# Patient Record
Sex: MALE | Race: WHITE | Employment: FULL TIME | URBAN - METROPOLITAN AREA
[De-identification: names, ages, dates, MRNs, and addresses within clinical notes are randomized per-mention and may not be internally consistent; named-entity substitution may affect disease eponyms.]

---

## 2018-02-12 ENCOUNTER — APPOINTMENT (OUTPATIENT)
Dept: MRI IMAGING | Age: 38
End: 2018-02-12
Attending: FAMILY MEDICINE
Payer: COMMERCIAL

## 2018-02-12 ENCOUNTER — APPOINTMENT (OUTPATIENT)
Dept: CT IMAGING | Age: 38
End: 2018-02-12
Attending: EMERGENCY MEDICINE
Payer: COMMERCIAL

## 2018-02-12 ENCOUNTER — HOSPITAL ENCOUNTER (OUTPATIENT)
Age: 38
Setting detail: OBSERVATION
Discharge: HOME | End: 2018-02-13
Attending: EMERGENCY MEDICINE | Admitting: FAMILY MEDICINE
Payer: COMMERCIAL

## 2018-02-12 DIAGNOSIS — F12.10 DRUG ABUSE, MARIJUANA: ICD-10-CM

## 2018-02-12 DIAGNOSIS — R47.9 SPEECH DISTURBANCE, UNSPECIFIED TYPE: Primary | ICD-10-CM

## 2018-02-12 DIAGNOSIS — E78.5 HYPERLIPIDEMIA, UNSPECIFIED HYPERLIPIDEMIA TYPE: ICD-10-CM

## 2018-02-12 DIAGNOSIS — F17.200 SMOKING: ICD-10-CM

## 2018-02-12 DIAGNOSIS — E66.01 MORBID OBESITY (HCC): ICD-10-CM

## 2018-02-12 DIAGNOSIS — G45.9 TRANSIENT CEREBRAL ISCHEMIA, UNSPECIFIED TYPE: ICD-10-CM

## 2018-02-12 DIAGNOSIS — I10 HYPERTENSION, UNSPECIFIED TYPE: ICD-10-CM

## 2018-02-12 LAB
ALBUMIN SERPL-MCNC: 3.9 G/DL (ref 3.5–5)
ALBUMIN/GLOB SERPL: 1.3 {RATIO} (ref 1.1–2.2)
ALP SERPL-CCNC: 74 U/L (ref 45–117)
ALT SERPL-CCNC: 36 U/L (ref 12–78)
AMPHET UR QL SCN: NEGATIVE
ANION GAP SERPL CALC-SCNC: 8 MMOL/L (ref 5–15)
AST SERPL-CCNC: 24 U/L (ref 15–37)
ATRIAL RATE: 68 BPM
BARBITURATES UR QL SCN: NEGATIVE
BASOPHILS # BLD: 0.1 K/UL (ref 0–0.1)
BASOPHILS NFR BLD: 1 % (ref 0–1)
BENZODIAZ UR QL: NEGATIVE
BILIRUB SERPL-MCNC: 0.3 MG/DL (ref 0.2–1)
BUN SERPL-MCNC: 15 MG/DL (ref 6–20)
BUN/CREAT SERPL: 18 (ref 12–20)
CALCIUM SERPL-MCNC: 8.7 MG/DL (ref 8.5–10.1)
CALCULATED P AXIS, ECG09: 29 DEGREES
CALCULATED R AXIS, ECG10: 83 DEGREES
CALCULATED T AXIS, ECG11: 21 DEGREES
CANNABINOIDS UR QL SCN: POSITIVE
CHLORIDE SERPL-SCNC: 108 MMOL/L (ref 97–108)
CO2 SERPL-SCNC: 25 MMOL/L (ref 21–32)
COCAINE UR QL SCN: NEGATIVE
CREAT SERPL-MCNC: 0.85 MG/DL (ref 0.7–1.3)
DIAGNOSIS, 93000: NORMAL
DIFFERENTIAL METHOD BLD: NORMAL
DRUG SCRN COMMENT,DRGCM: ABNORMAL
EOSINOPHIL # BLD: 0.4 K/UL (ref 0–0.4)
EOSINOPHIL NFR BLD: 4 % (ref 0–7)
ERYTHROCYTE [DISTWIDTH] IN BLOOD BY AUTOMATED COUNT: 13.2 % (ref 11.5–14.5)
ETHANOL SERPL-MCNC: <10 MG/DL
GLOBULIN SER CALC-MCNC: 3.1 G/DL (ref 2–4)
GLUCOSE BLD STRIP.AUTO-MCNC: 116 MG/DL (ref 65–100)
GLUCOSE BLD STRIP.AUTO-MCNC: 97 MG/DL (ref 65–100)
GLUCOSE SERPL-MCNC: 97 MG/DL (ref 65–100)
HCT VFR BLD AUTO: 43 % (ref 36.6–50.3)
HGB BLD-MCNC: 15.1 G/DL (ref 12.1–17)
IMM GRANULOCYTES # BLD: 0 K/UL (ref 0–0.04)
IMM GRANULOCYTES NFR BLD AUTO: 0 % (ref 0–0.5)
INR BLD: 4.6 (ref 0.9–1.2)
LYMPHOCYTES # BLD: 1.9 K/UL (ref 0.8–3.5)
LYMPHOCYTES NFR BLD: 19 % (ref 12–49)
MCH RBC QN AUTO: 31.4 PG (ref 26–34)
MCHC RBC AUTO-ENTMCNC: 35.1 G/DL (ref 30–36.5)
MCV RBC AUTO: 89.4 FL (ref 80–99)
METHADONE UR QL: NEGATIVE
MONOCYTES # BLD: 0.6 K/UL (ref 0–1)
MONOCYTES NFR BLD: 6 % (ref 5–13)
NEUTS SEG # BLD: 7 K/UL (ref 1.8–8)
NEUTS SEG NFR BLD: 70 % (ref 32–75)
NRBC # BLD: 0 K/UL (ref 0–0.01)
NRBC BLD-RTO: 0 PER 100 WBC
OPIATES UR QL: NEGATIVE
P-R INTERVAL, ECG05: 164 MS
PCP UR QL: NEGATIVE
PLATELET # BLD AUTO: 215 K/UL (ref 150–400)
PMV BLD AUTO: 10.4 FL (ref 8.9–12.9)
POTASSIUM SERPL-SCNC: 4.2 MMOL/L (ref 3.5–5.1)
PROT SERPL-MCNC: 7 G/DL (ref 6.4–8.2)
Q-T INTERVAL, ECG07: 378 MS
QRS DURATION, ECG06: 100 MS
QTC CALCULATION (BEZET), ECG08: 401 MS
RBC # BLD AUTO: 4.81 M/UL (ref 4.1–5.7)
SERVICE CMNT-IMP: ABNORMAL
SERVICE CMNT-IMP: NORMAL
SODIUM SERPL-SCNC: 141 MMOL/L (ref 136–145)
TROPONIN I SERPL-MCNC: <0.04 NG/ML
TROPONIN I SERPL-MCNC: <0.04 NG/ML
VENTRICULAR RATE, ECG03: 68 BPM
WBC # BLD AUTO: 10 K/UL (ref 4.1–11.1)

## 2018-02-12 PROCEDURE — 96361 HYDRATE IV INFUSION ADD-ON: CPT

## 2018-02-12 PROCEDURE — 70551 MRI BRAIN STEM W/O DYE: CPT

## 2018-02-12 PROCEDURE — 85025 COMPLETE CBC W/AUTO DIFF WBC: CPT | Performed by: EMERGENCY MEDICINE

## 2018-02-12 PROCEDURE — 96374 THER/PROPH/DIAG INJ IV PUSH: CPT

## 2018-02-12 PROCEDURE — 74011250636 HC RX REV CODE- 250/636: Performed by: FAMILY MEDICINE

## 2018-02-12 PROCEDURE — 80307 DRUG TEST PRSMV CHEM ANLYZR: CPT | Performed by: EMERGENCY MEDICINE

## 2018-02-12 PROCEDURE — 70450 CT HEAD/BRAIN W/O DYE: CPT

## 2018-02-12 PROCEDURE — 74011250636 HC RX REV CODE- 250/636

## 2018-02-12 PROCEDURE — 80053 COMPREHEN METABOLIC PANEL: CPT | Performed by: EMERGENCY MEDICINE

## 2018-02-12 PROCEDURE — 99218 HC RM OBSERVATION: CPT

## 2018-02-12 PROCEDURE — 74011250637 HC RX REV CODE- 250/637: Performed by: FAMILY MEDICINE

## 2018-02-12 PROCEDURE — 82962 GLUCOSE BLOOD TEST: CPT

## 2018-02-12 PROCEDURE — 99285 EMERGENCY DEPT VISIT HI MDM: CPT

## 2018-02-12 PROCEDURE — 85610 PROTHROMBIN TIME: CPT

## 2018-02-12 PROCEDURE — 84484 ASSAY OF TROPONIN QUANT: CPT | Performed by: FAMILY MEDICINE

## 2018-02-12 PROCEDURE — 36415 COLL VENOUS BLD VENIPUNCTURE: CPT | Performed by: FAMILY MEDICINE

## 2018-02-12 PROCEDURE — 93005 ELECTROCARDIOGRAM TRACING: CPT

## 2018-02-12 RX ORDER — MULTIVIT WITH MINERALS/HERBS
1 TABLET ORAL DAILY
COMMUNITY

## 2018-02-12 RX ORDER — THERA TABS 400 MCG
1 TAB ORAL DAILY
COMMUNITY

## 2018-02-12 RX ORDER — FAMOTIDINE 20 MG/1
20 TABLET, FILM COATED ORAL EVERY 12 HOURS
Status: DISCONTINUED | OUTPATIENT
Start: 2018-02-12 | End: 2018-02-13 | Stop reason: HOSPADM

## 2018-02-12 RX ORDER — PRAVASTATIN SODIUM 40 MG/1
40 TABLET ORAL
Status: DISCONTINUED | OUTPATIENT
Start: 2018-02-12 | End: 2018-02-13 | Stop reason: HOSPADM

## 2018-02-12 RX ORDER — ACETAMINOPHEN 650 MG/1
650 SUPPOSITORY RECTAL
Status: DISCONTINUED | OUTPATIENT
Start: 2018-02-12 | End: 2018-02-13 | Stop reason: HOSPADM

## 2018-02-12 RX ORDER — SODIUM CHLORIDE 0.9 % (FLUSH) 0.9 %
5-10 SYRINGE (ML) INJECTION EVERY 8 HOURS
Status: DISCONTINUED | OUTPATIENT
Start: 2018-02-12 | End: 2018-02-13 | Stop reason: HOSPADM

## 2018-02-12 RX ORDER — SODIUM CHLORIDE 9 MG/ML
75 INJECTION, SOLUTION INTRAVENOUS CONTINUOUS
Status: DISCONTINUED | OUTPATIENT
Start: 2018-02-12 | End: 2018-02-13

## 2018-02-12 RX ORDER — GUAIFENESIN 100 MG/5ML
81 LIQUID (ML) ORAL DAILY
Status: DISCONTINUED | OUTPATIENT
Start: 2018-02-13 | End: 2018-02-13

## 2018-02-12 RX ORDER — LORAZEPAM 2 MG/ML
0.5 INJECTION INTRAMUSCULAR ONCE
Status: COMPLETED | OUTPATIENT
Start: 2018-02-12 | End: 2018-02-12

## 2018-02-12 RX ORDER — LORAZEPAM 2 MG/ML
INJECTION INTRAMUSCULAR
Status: COMPLETED
Start: 2018-02-12 | End: 2018-02-12

## 2018-02-12 RX ORDER — ASPIRIN 81 MG/1
81 TABLET ORAL DAILY
Status: DISCONTINUED | OUTPATIENT
Start: 2018-02-13 | End: 2018-02-12 | Stop reason: SDUPTHER

## 2018-02-12 RX ORDER — ACETAMINOPHEN 325 MG/1
650 TABLET ORAL
Status: DISCONTINUED | OUTPATIENT
Start: 2018-02-12 | End: 2018-02-13 | Stop reason: HOSPADM

## 2018-02-12 RX ORDER — SODIUM CHLORIDE 0.9 % (FLUSH) 0.9 %
5-10 SYRINGE (ML) INJECTION AS NEEDED
Status: DISCONTINUED | OUTPATIENT
Start: 2018-02-12 | End: 2018-02-13 | Stop reason: HOSPADM

## 2018-02-12 RX ADMIN — FAMOTIDINE 20 MG: 20 TABLET, FILM COATED ORAL at 22:22

## 2018-02-12 RX ADMIN — PRAVASTATIN SODIUM 40 MG: 40 TABLET ORAL at 22:21

## 2018-02-12 RX ADMIN — LORAZEPAM 0.5 MG: 2 INJECTION INTRAMUSCULAR; INTRAVENOUS at 16:51

## 2018-02-12 RX ADMIN — Medication 10 ML: at 16:54

## 2018-02-12 RX ADMIN — SODIUM CHLORIDE 75 ML/HR: 900 INJECTION, SOLUTION INTRAVENOUS at 18:16

## 2018-02-12 RX ADMIN — LORAZEPAM 0.5 MG: 2 INJECTION INTRAMUSCULAR at 16:51

## 2018-02-12 RX ADMIN — Medication 10 ML: at 22:22

## 2018-02-12 NOTE — ED PROVIDER NOTES
HPI Comments: 42 y.o. male with past medical history significant for hyperlipidemia who presents to the ED with chief complaint of dysarthria. Patient reports speech difficulty and slight confusion with onset at ~1030 while speaking to his wife over the phone; reports not being able to \"get (his) words out\" and not being able to tell her where in his workplace he was. Patient reports feeling \"really bad\" after lifting objects at work \"this morning. \" Patient also reports having an infection in his foot. Patient denies a history of DM. There are no other acute medical concerns at this time. PCP: None    Note written by Paulina Andrews, as dictated by Saida Sepulveda MD 11:50 AM     The history is provided by the patient and the spouse. Past Medical History:   Diagnosis Date    Hyperlipidemia        No past surgical history on file. No family history on file. Social History     Social History    Marital status:      Spouse name: N/A    Number of children: N/A    Years of education: N/A     Occupational History    Not on file. Social History Main Topics    Smoking status: Current Every Day Smoker     Packs/day: 0.50    Smokeless tobacco: Not on file    Alcohol use Yes      Comment: rarely     Drug use: No    Sexual activity: Not on file     Other Topics Concern    Not on file     Social History Narrative         ALLERGIES: Review of patient's allergies indicates no known allergies. Review of Systems   Constitutional: Negative for appetite change, chills and fever. HENT: Negative for rhinorrhea, sore throat and trouble swallowing. Eyes: Negative for photophobia. Respiratory: Negative for cough and shortness of breath. Cardiovascular: Negative for chest pain and palpitations. Gastrointestinal: Negative for abdominal pain, nausea and vomiting. Genitourinary: Negative for dysuria, frequency and hematuria. Musculoskeletal: Negative for arthralgias. Neurological: Positive for speech difficulty. Negative for dizziness, syncope and weakness. Psychiatric/Behavioral: Positive for confusion. Negative for behavioral problems. The patient is not nervous/anxious. Vitals:    02/12/18 1149 02/12/18 1224 02/12/18 1230 02/12/18 1300   BP: (!) 172/105  (!) 131/92 121/82   Pulse: 79  70 68   Resp: 16  14 20   Temp: 98.4 °F (36.9 °C)      SpO2: 96% 94% 95% 97%   Weight: 126.7 kg (279 lb 4 oz)      Height: 5' 9\" (1.753 m)               Physical Exam   Constitutional: He appears well-developed and well-nourished. HENT:   Head: Normocephalic and atraumatic. Mouth/Throat: Oropharynx is clear and moist.   Eyes: EOM are normal. Pupils are equal, round, and reactive to light. Neck: Normal range of motion. Neck supple. Cardiovascular: Normal rate, regular rhythm, normal heart sounds and intact distal pulses. Exam reveals no gallop and no friction rub. No murmur heard. Pulmonary/Chest: Effort normal. No respiratory distress. He has no wheezes. He has no rales. Abdominal: Soft. There is no tenderness. There is no rebound. Musculoskeletal: Normal range of motion. He exhibits no tenderness. Neurological: He is alert. No cranial nerve deficit. Motor; symmetric   Skin: No erythema. Psychiatric: He has a normal mood and affect. His behavior is normal.   Nursing note and vitals reviewed. Note written by Paulina Mendez, as dictated by Venecia Parks MD 11:57 AM      [facility]      ED Course       Procedures    Stroke scale; level of consciousness; zero; month and age; zero; blink eyes; zero; extraocular movements zero; visual fields zero; facial palsy; zero; left arm; zero; right arm; zero; left leg; zero; right leg ;zero;sensation;zero; ataxia ; zero; language ;zero; dysarthria ;zero; extinction zero; Venecia Parks MD  11:47 AM    CONSULT NOTE:  12:32 PM Venecia Parks MD spoke with Dr. Araceli Ramirez, Consult for Tele-Neurology.  Discussed available diagnostic tests and clinical findings. Provider is in agreement with care plans as outlined. Dr. Ranjith Woods will see and evaluate the patient. PROGRESS NOTE:  12:45 PM  Dr. Ranjith Woods has evaluated the patient; recommends admitting the patient to the hospital for a TIA workup. CONSULT NOTE:  1:55 PM Betti Ganser, MD spoke with Dr. Jessie Mckeon, Consult for Hospitalist. Discussed available diagnostic tests and clinical findings. Provider is in agreement with care plans as outlined. Dr. Jessie Mckeon will see and admit the patient. ED EKG interpretation:  Rhythm: normal sinus rhythm; and regular . Rate (approx.): 70; Axis: normal; P wave: normal; QRS interval: normal ; ST/T wave: normal; in  Lead: ; Other findings: . This EKG was interpreted by Betti Ganser, MD,ED Provider.  3:06 PM

## 2018-02-12 NOTE — ED NOTES
Received notification from MRI staff, pt was having a \"claustrophobic episode in MRI\" and requested medication. Dr. aMrk Solomon notified and new orders were received.  See MAR,

## 2018-02-12 NOTE — ED NOTES
Bedside and Verbal shift change report given to Abner Rebolledo RN (oncoming nurse) by Robyn Araya RN (offgoing nurse). Report given with SBAR, Kardex, Intake/Output, MAR and Recent Results.

## 2018-02-12 NOTE — H&P
[address]  ACUTE CARE HISTORY AND PHYSICAL    Jennifer Zavala  MR#: [id number]  : 1980  ACCOUNT #: [de-identified]   DATE OF SERVICE: 2018    CHIEF COMPLAINT: TIA. HISTORY OF PRESENT ILLNESS:  The patient is a 64-year-old male with a past medical history of hypercholesterolemia, who presents to the hospital complaining of a chief complaint of dysarthria. Patient reports that today he woke up, had a mild headache associated with some confusion and \"disorientation. \" The patient reports that he \"could not explain as to what is going on, but it almost felt like an out of body experience. \"  The patient reports that he felt disoriented, not having clear thoughts and \"just not himself. \"  Patient reports that he went to work and got more confused, came out of an apartment and did not remember why and what he was doing in that apartment (patient works in an apartment complex). The patient reports that he also had some slurred speech and got concerned and decided to come to the hospital.  The patient reports all his symptoms have resolved at this point in time. The patient reports that he \"felt really bad\"  after lifting objects at work. The wife also reports that the patient is getting somewhat of a rash on the foot, but that seemed to have resolved now. The patient denies any other complaints or problems. Denies any headache, blurry vision, sore throat, trouble swallowing, trouble with speech, any chest pain, shortness of breath, cough, fever, chills, abdominal pain, constipation, diarrhea, urinary symptoms, focal or generalized neurological weakness, recent travel, sick contacts, falls, injuries, hematemesis, melena, hemoptysis, or any other concerns or problems besides the symptoms mentioned above. HOME MEDICATIONS:  Multivitamin, probiotic, , vitamin B complex, aspirin 81 mg daily.     SOCIAL HISTORY:  Current every day smoker, smokes half a pack per day, occasional alcohol and smokes marijuana on a daily basis. ALLERGIES:  NO KNOWN DRUG ALLERGIES. FAMILY HISTORY:  Family history was discussed. No history of CVA in the family. REVIEW OF SYSTEMS:  All systems were reviewed and were found to be essentially negative except for symptoms mentioned above. PHYSICAL EXAMINATION:  VITAL SIGNS:  Temperature 98.4, pulse 68, respiratory rate 20, blood pressure 131/82, pulse oximetry 97% on room air. GENERAL:  Alert x3, awake, no acute distress, resting in bed. Pleasant male, appears to be stated age. HEENT:  Pupils equal and reactive to light. Dry mucous membranes. Tympanic membranes clear. NECK:  Supple. CHEST:  Clear to auscultation bilaterally. HEART:  S1, S2 heard. ABDOMEN:  Soft, nontender, nondistended. Bowel sounds are physiologic. EXTREMITIES:  No clubbing, no cyanosis, no edema. NEUROPSYCHIATRIC:  Pleasant mood and affect. Cranial nerves II-XII grossly intact. Sensory grossly within normal limits. DTR 2+/4. Strength 5/5. SKIN:  Warm. LABORATORY DATA:  White count 10.0, hemoglobin 15.1, hematocrit 43, platelets 972,864. Sodium 141, potassium 4.2, chloride 108, bicarbonate 25, anion gap 8, glucose 97, BUN 15, creatinine 0.85, calcium 8.7, bilirubin total 0.3, ALT 36, AST 24, alkaline phosphatase 74. Urine drug screen shows positive for marijuana. IMAGING: CT of the head shows no acute abnormality. EKG shows normal sinus rhythm. ASSESSMENT:  1. Transient ischemic attack. The patient  will be admitted on a neuro telemetry bed. We will get MRI of the brain, neurovascular checks, aspirin, statin, echocardiogram, neurology consult, close monitoring and further intervention will be per hospital course. Reassess as needed and continue to monitor. 2.  History of marijuana abuse. The patient was counseled. 3.  History of tobacco abuse. Patient was counseled. 4.  History of hyperlipidemia.   Will get a fasting lipid profile and continue statin. 5.  Gastrointestinal and deep venous thrombosis prophylaxis. The patient will be on sequential compression devices.       Dereje Roman MD MM / Darlene Nation  D: 02/12/2018 15:12     T: 02/12/2018 15:46  JOB #: [id number]

## 2018-02-12 NOTE — IP AVS SNAPSHOT
[address] [facility] 13 
[phone] Patient: Sarah Beth Hoskins MRN: [id number] FCQ:[id number] About your hospitalization You were admitted on:  February 12, 2018 You last received care in the:  [facility] 6S NEURO-SCI TELE You were discharged on:  February 13, 2018 Why you were hospitalized Your primary diagnosis was:  Tia (Transient Ischemic Attack) Follow-up Information Follow up With Details Comments Contact Annelise Chambers MD Schedule an appointment as soon as possible for a visit in 1 week hospital fu Patient can only remember the practice name and not the physician Fabrice Melvin MD  for your information [address] [facility] [address] [facility] 13 
[phone] Subhash Gustafson MD  psychiatrist [address] [facility] 13 
[phone] Discharge Orders None A check saman indicates which time of day the medication should be taken. My Medications START taking these medications Instructions Each Dose to Equal  
 Morning Noon Evening Bedtime  
 aspirin 325 mg tablet Commonly known as:  ASPIRIN Start taking on:  2/14/2018 Replaces:  aspirin delayed-release 81 mg tablet Your last dose was: Your next dose is: Take 1 Tab by mouth daily. 325 mg  
    
   
   
   
  
 pravastatin 40 mg tablet Commonly known as:  PRAVACHOL Your last dose was: Your next dose is: Take 1 Tab by mouth nightly. 40 mg CONTINUE taking these medications Instructions Each Dose to Equal  
 Morning Noon Evening Bedtime  
 b complex vitamins tablet Your last dose was: Your next dose is: Take 1 Tab by mouth daily. 1 Tab Krill-OM3-DHA-EPA-OM6-Lip-Astx 1000-130(40-80) mg Cap Your last dose was: Your next dose is: Take 1 Cap by mouth daily. 1 Cap L. acidoph & paracasei- S therm- Bifido 8 billion cell Cap cap Commonly known as:  MICHELLE-Q/RISAQUAD Your last dose was: Your next dose is: Take 1 Cap by mouth daily. 1 Cap  
    
   
   
   
  
 therapeutic multivitamin tablet Commonly known as:  [facility] Your last dose was: Your next dose is: Take 1 Tab by mouth daily. 1 Tab STOP taking these medications   
 aspirin delayed-release 81 mg tablet Replaced by:  aspirin 325 mg tablet Where to Get Your Medications Information on where to get these meds will be given to you by the nurse or doctor. ! Ask your nurse or doctor about these medications  
  aspirin 325 mg tablet  
 pravastatin 40 mg tablet Discharge Instructions Discharge Instructions PATIENT ID: Zabrina Adams MRN: [id number] YOB: 1980 DATE OF ADMISSION: 2/12/2018 12:00 PM   
DATE OF DISCHARGE: 2/13/2018 PRIMARY CARE PROVIDER: Sami Chambers MD  
 
 
DISCHARGING PHYSICIAN: Jose Luis Zaidi NP To contact this individual call [phone] and ask the  to page. If unavailable ask to be transferred the Adult Hospitalist Department. DISCHARGE DIAGNOSES TIA vs Transient Global Amnesia CONSULTATIONS: IP CONSULT TO HOSPITALIST 
IP CONSULT TO NEUROLOGY PROCEDURES/SURGERIES: * No surgery found * PENDING TEST RESULTS:  
At the time of discharge the following test results are still pending: echo read/ Carotid duplex FOLLOW UP APPOINTMENTS:  
Follow-up Information Follow up With Details Comments Contact Info Sami Chambers MD Schedule an appointment as soon as possible for a visit in 1 week hospital fu Patient can only remember the practice name and not the physician Denver Liner, MD  for your information [address] [facility] [address] [facility] [address] [phone] Daniel Alvarado MD  psychiatrist [address] [facility] [place]  
[phone] ADDITIONAL CARE RECOMMENDATIONS: Continue home meds as previous. You are doing a good job reducing your triglycerides with diet modifications. Continue with this. Exercise will help your lipid profile. Avoid Tobacco use and marijuana use Aspirin increase to full aspirin 325 mg and reduce controllable risk factors Please follow up with carotid duplex DIET:low fat, low carb, reduced calories ACTIVITY: resume WOUND CARE: na 
 
EQUIPMENT needed: na 
 
 
  
 SNF/Inpatient Rehab/LTAC  
x Independent/assisted living Hospice Other: CDMP Checked:  
Yes x Signed:  
Vickie Rojo NP 
2/13/2018 11:32 AM 
 
  
  
  
Introducing [facility]! Dear Roly Hackett: 
Thank you for requesting a [employer] account. Our records indicate that you already have an active [employer] account. You can access your account anytime at [email or web address]. [employer]imization/[employer] Did you know that you can access your hospital and ER discharge instructions at any time in [employer]? You can also review all of your test results from your hospital stay or ER visit. Additional Information If you have questions, please visit the Frequently Asked Questions section of the [employer]ot website at [email or web address] Remember, [employer] is NOT to be used for urgent needs. For medical emergencies, dial 911. Now available from your iPhone and Android! Providers Seen During Your Hospitalization Provider Specialty Primary office phone Jessi Small MD Emergency Medicine [phone] Yolanda Kwon MD Hospitalist [phone] Marcial Anderson MD Internal Medicine [phone] Your Primary Care Physician (PCP) Primary Care Physician Office Phone Office Fax OTHER, PHYS ** None ** ** None ** You are allergic to the following No active allergies Recent Documentation Height Weight BMI Smoking Status 1.753 m 122.2 kg 39.78 kg/m2 Current Every Day Smoker Emergency Contacts Name Discharge Info Relation Home Work Mobile Francheska Warren DISCHARGE CAREGIVER [3] Spouse [3] [phone] Patient Belongings The following personal items are in your possession at time of discharge: 
  Dental Appliances: None  Visual Aid: None      Home Medications: None   Jewelry: None  Clothing: At bedside    Other Valuables: Cell Phone Please provide this summary of care documentation to your next provider. Signatures-by signing, you are acknowledging that this After Visit Summary has been reviewed with you and you have received a copy. Patient Signature:  ____________________________________________________________ Date:  ____________________________________________________________  
  
Eric Whittaker Provider Signature:  ____________________________________________________________ Date:  ____________________________________________________________

## 2018-02-12 NOTE — ED TRIAGE NOTES
Pt stated he woke up normal this am and went to work, at work around 1030 this am pt stated he felt confused and was having trouble getting his words out, he called his wife and she stated that his speech was slurred, denies fever, +drowsy in triage, wife stated that she does his feet and he has an infection in his foot

## 2018-02-12 NOTE — PROGRESS NOTES
Admission Medication Reconciliation:    Information obtained from:  Patient    Comments/Recommendations: All medications/allergies have been reviewed and updated; last medication administration times reviewed and recorded. The patient was an excellent historian and could re-call names of medications and last administration times. Changes made to Prior to Admission (PTA) Medication List:   ?   Medications Added:   - Krill oil, FloraQ, multivitamin, B-complex vitamins  ? Medications Changed:   - None   ? Medications Removed:   - Xanax 0.5 mg Q8H prn (completed-1 time Rx)  - Fish Oil (changed to [facility])       Significant PMH/Disease States:   Past Medical History:   Diagnosis Date    Hyperlipidemia        Chief Complaint for this Admission:    Chief Complaint   Patient presents with    Dysarthria       Allergies:  Review of patient's allergies indicates no known allergies. Prior to Admission Medications:   Prior to Admission Medications   Prescriptions Last Dose Informant Patient Reported? Taking? Krill-OM3-DHA-EPA-OM6-Lip-Astx 1000-130(40-80) mg cap 2/12/2018 at AM  Yes Yes   Sig: Take 1 Cap by mouth daily. L. acidoph & paracasei- S therm- Bifido (MICHELLE-Q/RISAQUAD) 8 billion cell cap cap 2/12/2018 at AM  Yes Yes   Sig: Take 1 Cap by mouth daily. aspirin delayed-release 81 mg tablet 2/12/2018 at AM  Yes Yes   Sig: Take 81 mg by mouth daily. b complex vitamins tablet 2/12/2018 at AM  Yes Yes   Sig: Take 1 Tab by mouth daily. therapeutic multivitamin (THERAGRAN) tablet 2/12/2018 at AM  Yes Yes   Sig: Take 1 Tab by mouth daily. Facility-Administered Medications: None     Thank you for allowing pharmacy to participate in the coordination of this patient's care. If you have any other questions, please contact the medication reconciliation pharmacist at x [phone]. Tushar Coleman, Pharm. D.

## 2018-02-12 NOTE — PROGRESS NOTES
Spiritual Care Assessment/Progress Notes    Kashmir Whitehead [id number]  [id number]    1980  42 y.o.  male    Patient Telephone Number: [phone] (home)   Restorationism Affiliation: No Catholic   Language: English   Extended Emergency Contact Information  Primary Emergency Contact: Travis Valle Phone: [phone]  Relation: Spouse   Patient Active Problem List    Diagnosis Date Noted    Abnormal EKG 04/14/2014    Hyperlipidemia 04/14/2014        Date: 2/12/2018       Level of Restorationism/Spiritual Activity:  []         Involved in marika tradition/spiritual practice    []         Not involved in marika tradition/spiritual practice  []         Spiritually oriented    []         Claims no spiritual orientation    []         seeking spiritual identity  []         Feels alienated from Temple practice/tradition  []         Feels angry about Temple practice/tradition  []         Spirituality/Temple tradition a resource for coping at this time.   [x]         Not able to assess due to medical condition    Services Provided Today:  []         crisis intervention    []         reading Scriptures  []         spiritual assessment    []         prayer  []         empathic listening/emotional support  []         rites and rituals (cite in comments)  []         life review     []         Temple support  []         theological development   []         advocacy  []         ethical dialog     []         blessing  []         bereavement support    []         support to family  []         anticipatory grief support   []         help with AMD  []         spiritual guidance    []         meditation      Spiritual Care Needs  []         Emotional Support  []         Spiritual/Restorationism Care  []         Loss/Adjustment  []         Advocacy/Referral                /Ethics  []         No needs expressed at               this time  []         Other: (note in               comments)  Spiritual Care Plan  []         Follow up visits with               pt/family  []         Provide materials  []         Schedule sacraments  []         Contact Community               Clergy  [x]         Follow up as needed  []         Other: (note in               comments)     Comments: I responded to the code stroke in the ED. I was unable to speak with Mr. Warren at the time because the medical team was working with him. Spiritual care remains available if needed. Rev. Cee Baez M.Div, [facility]

## 2018-02-12 NOTE — ED NOTES
Wife reported pt had an \"inability to recall a few words\". Pt said he just felt \"foggy\". A repeat neuro assessment was done and pt exhibited no signs of memory loss, recall or deficit.

## 2018-02-13 VITALS
HEART RATE: 69 BPM | HEIGHT: 69 IN | BODY MASS INDEX: 39.9 KG/M2 | WEIGHT: 269.4 LBS | SYSTOLIC BLOOD PRESSURE: 166 MMHG | TEMPERATURE: 97.8 F | DIASTOLIC BLOOD PRESSURE: 90 MMHG | RESPIRATION RATE: 18 BRPM | OXYGEN SATURATION: 98 %

## 2018-02-13 LAB
CHOLEST SERPL-MCNC: 137 MG/DL
ERYTHROCYTE [DISTWIDTH] IN BLOOD BY AUTOMATED COUNT: 13.2 % (ref 11.5–14.5)
EST. AVERAGE GLUCOSE BLD GHB EST-MCNC: 114 MG/DL
HBA1C MFR BLD: 5.6 % (ref 4.2–6.3)
HCT VFR BLD AUTO: 42.4 % (ref 36.6–50.3)
HDLC SERPL-MCNC: 29 MG/DL
HDLC SERPL: 4.7 {RATIO} (ref 0–5)
HGB BLD-MCNC: 14.4 G/DL (ref 12.1–17)
LDLC SERPL CALC-MCNC: 35 MG/DL (ref 0–100)
LIPID PROFILE,FLP: ABNORMAL
MCH RBC QN AUTO: 30.9 PG (ref 26–34)
MCHC RBC AUTO-ENTMCNC: 34 G/DL (ref 30–36.5)
MCV RBC AUTO: 91 FL (ref 80–99)
NRBC # BLD: 0 K/UL (ref 0–0.01)
NRBC BLD-RTO: 0 PER 100 WBC
PLATELET # BLD AUTO: 218 K/UL (ref 150–400)
PMV BLD AUTO: 10.5 FL (ref 8.9–12.9)
RBC # BLD AUTO: 4.66 M/UL (ref 4.1–5.7)
TRIGL SERPL-MCNC: 365 MG/DL (ref ?–150)
TROPONIN I SERPL-MCNC: <0.04 NG/ML
VLDLC SERPL CALC-MCNC: 73 MG/DL
WBC # BLD AUTO: 8.3 K/UL (ref 4.1–11.1)

## 2018-02-13 PROCEDURE — 80061 LIPID PANEL: CPT | Performed by: FAMILY MEDICINE

## 2018-02-13 PROCEDURE — 74011250637 HC RX REV CODE- 250/637: Performed by: FAMILY MEDICINE

## 2018-02-13 PROCEDURE — 84484 ASSAY OF TROPONIN QUANT: CPT | Performed by: FAMILY MEDICINE

## 2018-02-13 PROCEDURE — 96361 HYDRATE IV INFUSION ADD-ON: CPT

## 2018-02-13 PROCEDURE — 85027 COMPLETE CBC AUTOMATED: CPT | Performed by: FAMILY MEDICINE

## 2018-02-13 PROCEDURE — 36415 COLL VENOUS BLD VENIPUNCTURE: CPT | Performed by: FAMILY MEDICINE

## 2018-02-13 PROCEDURE — 83036 HEMOGLOBIN GLYCOSYLATED A1C: CPT | Performed by: FAMILY MEDICINE

## 2018-02-13 PROCEDURE — 99218 HC RM OBSERVATION: CPT

## 2018-02-13 PROCEDURE — 93306 TTE W/DOPPLER COMPLETE: CPT

## 2018-02-13 RX ORDER — ASPIRIN 325 MG
325 TABLET ORAL DAILY
Status: DISCONTINUED | OUTPATIENT
Start: 2018-02-14 | End: 2018-02-13 | Stop reason: HOSPADM

## 2018-02-13 RX ORDER — SODIUM CHLORIDE 0.9 % (FLUSH) 0.9 %
20 SYRINGE (ML) INJECTION
Status: DISCONTINUED | OUTPATIENT
Start: 2018-02-13 | End: 2018-02-13 | Stop reason: HOSPADM

## 2018-02-13 RX ORDER — PRAVASTATIN SODIUM 40 MG/1
40 TABLET ORAL
Qty: 30 TAB | Refills: 1 | Status: ON HOLD | OUTPATIENT
Start: 2018-02-13 | End: 2021-05-11

## 2018-02-13 RX ORDER — ASPIRIN 325 MG
325 TABLET ORAL DAILY
Qty: 30 TAB | Refills: 3 | Status: ON HOLD | OUTPATIENT
Start: 2018-02-14 | End: 2021-05-11

## 2018-02-13 RX ORDER — SODIUM CHLORIDE 0.9 % (FLUSH) 0.9 %
20 SYRINGE (ML) INJECTION
Status: COMPLETED | OUTPATIENT
Start: 2018-02-13 | End: 2018-02-13

## 2018-02-13 RX ADMIN — ASPIRIN 81 MG 81 MG: 81 TABLET ORAL at 09:04

## 2018-02-13 RX ADMIN — FAMOTIDINE 20 MG: 20 TABLET, FILM COATED ORAL at 09:05

## 2018-02-13 RX ADMIN — Medication 20 ML: at 10:27

## 2018-02-13 NOTE — PROGRESS NOTES
Hospitalist Progress Note  Mariela Page NP  Answering service: [phone] OR [phone] from in house phone  Cell: [phone]      Date of Service:  2018  NAME:  Senthil Emmanuel  :  1980  MRN:  [id number]      Admission Summary:   42year old male with PMH of Hypercholesterolemia, daily marijuana abuse, 1 PPD tobacco abuse,  and obesity who presented yesterday with confusion and dysarthria. He has a history of migraines in the past and has been actively working on his triglycerides and diet. He currently does not have any symptoms. Waiting for Echo with bubble study and Neuro consult    Interval history / Subjective:     Mr. Atul Pemberton and his wife are insistent that he have a diagnosis. I explained that his testing was normal thus far and that he would be able to discharge today. We discussed further work up as an outpatient. I discussed with Jaime Melara RN to call echo back as they were sent away about 30 minutes ago due to no order for RN to not travel with patient     Assessment & Plan:     Unlikely TIA, Possible Migraine?(POA)  CT and Brain MRI normal  LDL well controlled, Triglycerides high  Hgb A1C normal  Echo with bubble study pending  Neuro consult pending  ASA and Statin    Dyslipidemia  Continue meds  Lifestyle modifications    History of Marijuana Abuse  Discussed at great length    History of Tobacco Abuse  Counseled      Code status: Full  DVT prophylaxis: ambulatory    Care Plan discussed with: Patient/Family, Nurse and   Disposition: Home w/Family and TBD     Hospital Problems  Date Reviewed: 2018          Codes Class Noted POA    * (Principal)TIA (transient ischemic attack) ICD-10-CM: G45.9  ICD-9-CM: 435.9  2018 No                Review of Systems:   A comprehensive review of systems was negative except for that written in the HPI. Vital Signs:    Last 24hrs VS reviewed since prior progress note.  Most recent are:  Visit Vitals    /68    Pulse 78    Temp 97.8 °F (36.6 °C)    Resp 18    Ht 5' 9\" (1.753 m)    Wt 122.2 kg (269 lb 6.4 oz)    SpO2 91%    BMI 39.78 kg/m2       No intake or output data in the 24 hours ending 02/13/18 0916     Physical Examination:             Constitutional:  No acute distress, cooperative, pleasant    ENT:  Oral mucous moist, oropharynx benign. Neck supple,    Resp:  CTA bilaterally. No wheezing/rhonchi/rales. No accessory muscle use   CV:  Regular rhythm, normal rate, no murmurs, gallops, rubs    GI:  Soft, non distended, non tender. normoactive bowel sounds, no hepatosplenomegaly     Musculoskeletal:  No edema, warm, 2+ pulses throughout    Neurologic:  Moves all extremities. AAOx3, PERRL, EOMS intact     Psych:  Good insight, Not anxious nor agitated. Skin:  Good turgor, no rashes or ulcers  Eyes:  EOMI. Anicteric sclerae, PERRL. Data Review:    Review and/or order of clinical lab test  Review and/or order of tests in the radiology section of [facility]      Labs:     Recent Labs      02/13/18   0356  02/12/18   1207   WBC  8.3  10.0   HGB  14.4  15.1   HCT  42.4  43.0   PLT  218  215     Recent Labs      02/12/18   1207   NA  141   K  4.2   CL  108   CO2  25   BUN  15   CREA  0.85   GLU  97   CA  8.7     Recent Labs      02/12/18   1207   SGOT  24   ALT  36   AP  74   TBILI  0.3   TP  7.0   ALB  3.9   GLOB  3.1     Recent Labs      02/12/18   1150   INR  4.6*      No results for input(s): FE, TIBC, PSAT, FERR in the last 72 hours. No results found for: FOL, RBCF   No results for input(s): PH, PCO2, PO2 in the last 72 hours.   Recent Labs      02/13/18   0356  02/12/18   1809  02/12/18   1207   TROIQ  <0.04  <0.04  <0.04     Lab Results   Component Value Date/Time    Cholesterol, total 137 02/13/2018 03:56 AM    HDL Cholesterol 29 02/13/2018 03:56 AM    LDL, calculated 35 02/13/2018 03:56 AM    Triglyceride 365 (H) 02/13/2018 03:56 AM    CHOL/HDL Ratio 4.7 02/13/2018 03:56 AM     Lab Results   Component Value Date/Time    Glucose (POC) 97 02/12/2018 06:11 PM    Glucose (POC) 116 (H) 02/12/2018 11:49 AM     Lab Results   Component Value Date/Time    Color YELLOW 04/01/2009 10:45 AM    Appearance CLEAR 04/01/2009 10:45 AM    Specific gravity >1.030 (H) 04/01/2009 10:45 AM    pH (UA) 5.0 04/01/2009 10:45 AM    Protein NEGATIVE  04/01/2009 10:45 AM    Glucose NEGATIVE  04/01/2009 10:45 AM    Ketone NEGATIVE  04/01/2009 10:45 AM    Bilirubin NEGATIVE  04/01/2009 10:45 AM    Urobilinogen 0.2 04/01/2009 10:45 AM    Nitrites NEGATIVE  04/01/2009 10:45 AM    Leukocyte Esterase NEGATIVE  04/01/2009 10:45 AM    Epithelial cells 0-5 04/01/2009 10:45 AM    Bacteria NEGATIVE  04/01/2009 10:45 AM    WBC 0-4 04/01/2009 10:45 AM    RBC 0-3 04/01/2009 10:45 AM         Medications Reviewed:     Current Facility-Administered Medications   Medication Dose Route Frequency    sodium chloride (NS) flush 5-10 mL  5-10 mL IntraVENous Q8H    sodium chloride (NS) flush 5-10 mL  5-10 mL IntraVENous PRN    acetaminophen (TYLENOL) tablet 650 mg  650 mg Oral Q4H PRN    Or    acetaminophen (TYLENOL) solution 650 mg  650 mg Per NG tube Q4H PRN    Or    acetaminophen (TYLENOL) suppository 650 mg  650 mg Rectal Q4H PRN    aspirin chewable tablet 81 mg  81 mg Oral DAILY    pravastatin (PRAVACHOL) tablet 40 mg  40 mg Oral QHS    famotidine (PEPCID) tablet 20 mg  20 mg Oral Q12H     ______________________________________________________________________  EXPECTED LENGTH OF STAY: - - -  ACTUAL LENGTH OF STAY:          0                 Gabriela Jara NP

## 2018-02-13 NOTE — INTERDISCIPLINARY ROUNDS
IDR/SLIDR Summary          Patient: Rajeev Hu MRN: [id number]    Age: 42 y.o. YOB: 1980 Room/Bed: [facility]   Admit Diagnosis: TIA (transient ischemic attack)  Principal Diagnosis: TIA (transient ischemic attack)   Goals: Home after echo, carotid duplex, PT/OT/ST evals  Readmission: NO  Quality Measure: Not applicable  VTE Prophylaxis: Mechanical  Influenza Vaccine screening completed? YES  Pneumococcal Vaccine screening completed? NO  Mobility needs: No   Nutrition plan:No  Consults:Case Management    Financial concerns:No  Escalated to CM? YES  RRAT Score: 0     Testing due for pt today?  YES  LOS: 0 days Expected length of stay 1-2 days  Discharge plan: Home   PCP: Sami Chambers MD  Transportation needs: No    Days before discharge: Discharge today  Discharge disposition: Home    Signed:     Millicent Caraballo RN  2/13/2018  12:11 AM

## 2018-02-13 NOTE — PROGRESS NOTES
[facility] February 13, 2018       RE: Ishan Pickard      To Whom It May Concern,    This is to certify that Ishan Pickard was hospitalized from February 12-13, 2018. Please excuse him from work those days. Please feel free to contact my office if you have any questions or concerns. Thank you for your assistance in this matter.       Sincerely,      Dr. Janes Jones RN                                                                                                                   [phone]

## 2018-02-13 NOTE — PROGRESS NOTES
The patient presented to the ED with dysarthria. The CM met with the patient and spouse at bedside in order to introduce the role of CM and assess for needs. The patient lives at home with his wife, and endorses being independent with all ADLs and IADLs prior to hospitalization. The patient stated that he sees physicians at [facility]- no PCP- CM inquired as to whether or not the patient would like a list of [facility] PCPs, patient agreeable, CM provided list at bedside. The patient endorsed that the patient's wife will provide transportation upon discharge. CM will await PT/OT recommendations for any services needed upon discharge. No other questions or concerns noted at this time, CM will continue to follow as discharge needs arise. YADY Damon     CM met with patient at bedside to discuss follow-up appointment for [facility]- patient stated he would make his own appointment, declined CM making follow-up. CM will continue to follow as discharge needs arise. YADY Damon    Care Management Interventions  PCP Verified by CM:  Yes (Patient states he sees \"Whatever Physician is available at [facility]\"- CM provided patient with list of [facility] PCPs to review, patient agreeable and will review )  Transition of Care Consult (CM Consult): Discharge Planning  MyChart Signup: Yes  Discharge Durable Medical Equipment: No  Health Maintenance Reviewed: Yes  Physical Therapy Consult: Yes  Occupational Therapy Consult: Yes  Speech Therapy Consult: Yes  Current Support Network: Lives with Spouse, Own Home (Patient lives with spouse- spouse present at bedside during assessment)  Confirm Follow Up Transport: Family (Patient confirmed spouse will be providing transportation upon discharge)  Plan discussed with Pt/Family/Caregiver: Yes  Discharge Location  Discharge Placement: Home (CM will await PT/OT recommendations upon discharge)

## 2018-02-13 NOTE — PROGRESS NOTES
Stroke Education provided to patient and the following topics were discussed    1. Patients personal risk factors for stroke are smoking, hyperlipidemia, obesity and sleep apnea screen    2. Warning signs of Stroke:        * Sudden numbness or weakness of the face, arm or leg, especially on one side of          The body            * Sudden confusion, trouble speaking or understanding        * Sudden trouble seeing in one or both eyes        * Sudden trouble walking, dizziness, loss of balance or coordination        * Sudden severe headache with no known cause      3. Importance of activation Emergency Medical Services ( 9-1-1 ) immediately if experience any warning signs of stroke. 4. Be sure and schedule a follow-up appointment with your primary care doctor or any specialists as instructed. 5. You must take medicine every day to treat your risk factors for stroke. Be sure to take your medicines exactly as your doctor tells you: no more, no less. Know what your medicines are for , what they do. Anti-thrombotics /anticoagulants can help prevent strokes. You are taking the following medicine(s)  Aspirin 325mg     6. Smoking and second-hand smoke greatly increase your risk of stroke, cardiovascular disease and death. Smoking history cigarettes and marijuana    7. Information provided was [facility] Stroke Education Binder, Stroke Handouts or Verbal Education    8. Documentation of teaching completed in Patient Education Activity and on Care Plan with teaching response noted?   yes

## 2018-02-13 NOTE — PROGRESS NOTES
Speech pathology  Orders received, chart reviewed and note patient initially came to ED with some dysarthria and confusion but patient report that symptoms had resolved by the time he was in the ED. MRI completed and negative. Patient passed STAND and is on a regular diet/ thin liquids. Formal speech/swallow evaluation not indicated at this time. Will complete orders.  Matt Carter M.S. ALEXYS-SLP

## 2018-02-13 NOTE — PROGRESS NOTES
Physical Therapy   2/13/18    Discussed case with RN and pt. Observed pt mobility and ADLs within hospital room. Pt was IND with all activities and does not require a formal skilled acute care PT evaluation at this time. He is able to go home without any skilled PT needs.      Kisha Millard, SPT

## 2018-02-13 NOTE — DISCHARGE INSTRUCTIONS
Discharge Instructions       PATIENT ID: Rajeev Hu  MRN: [id number]   YOB: 1980    DATE OF ADMISSION: 2/12/2018 12:00 PM    DATE OF DISCHARGE: 2/13/2018    PRIMARY CARE PROVIDER: Monica Middleton MD       DISCHARGING PHYSICIAN: Tal Jaffe NP    To contact this individual call [phone] and ask the  to page. If unavailable ask to be transferred the Adult Hospitalist Department. DISCHARGE DIAGNOSES TIA vs Transient Global Amnesia    CONSULTATIONS: IP CONSULT TO HOSPITALIST  IP CONSULT TO NEUROLOGY    PROCEDURES/SURGERIES: * No surgery found *    PENDING TEST RESULTS:   At the time of discharge the following test results are still pending: echo read/ Carotid duplex    FOLLOW UP APPOINTMENTS:   Follow-up Information     Follow up With Details Comments Contact Annelise Chambers MD Schedule an appointment as soon as possible for a visit in 1 week hospital fu Patient can only remember the practice name and not the physician      Manoj Vera MD  for your information [facility] [address]      Nick Chadwick MD  psychiatrist [address] [place] [address]  [phone]             ADDITIONAL CARE RECOMMENDATIONS: Continue home meds as previous. You are doing a good job reducing your triglycerides with diet modifications. Continue with this. Exercise will help your lipid profile. Avoid Tobacco use and marijuana use  Aspirin increase to full aspirin 325 mg and reduce controllable risk factors  Please follow up with carotid duplex    DIET:low fat, low carb, reduced calories    ACTIVITY: resume    WOUND CARE: na    EQUIPMENT needed: na      DISCHARGE MEDICATIONS:   See Medication Reconciliation Form    · It is important that you take the medication exactly as they are prescribed.    · Keep your medication in the bottles provided by the pharmacist and keep a list of the medication names, dosages, and times to be taken in your wallet. · Do not take other medications without consulting your doctor. NOTIFY YOUR PHYSICIAN FOR ANY OF THE FOLLOWING:   Fever over 101 degrees for 24 hours. Chest pain, shortness of breath, fever, chills, nausea, vomiting, diarrhea, change in mentation, falling, weakness, bleeding. Severe pain or pain not relieved by medications. Or, any other signs or symptoms that you may have questions about.       DISPOSITION:    Home With:   OT  PT  HH  RN       SNF/Inpatient Rehab/LTAC   x Independent/assisted living    Hospice    Other:     CDMP Checked:   Yes x       Signed:   Janay Matos NP  2/13/2018  11:32 AM

## 2018-02-13 NOTE — ED NOTES
TRANSFER - OUT REPORT:    Verbal report given to Sophronia Paget, RN (name) on Taye Alvarado  being transferred to [facility] (unit) for routine progression of care       Report consisted of patients Situation, Background, Assessment and   Recommendations(SBAR). Information from the following report(s) SBAR, Kardex, ED Summary, Intake/Output, MAR, Recent Results and Cardiac Rhythm NSR was reviewed with the receiving nurse. Lines:   Peripheral IV 02/12/18 Right Antecubital (Active)   Site Assessment Clean, dry, & intact 2/12/2018 12:06 PM   Phlebitis Assessment 0 2/12/2018 12:06 PM   Infiltration Assessment 0 2/12/2018 12:06 PM   Dressing Status Clean, dry, & intact 2/12/2018 12:06 PM   Dressing Type Transparent 2/12/2018 12:06 PM   Hub Color/Line Status Green;Flushed;Patent 2/12/2018 12:06 PM   Action Taken Catheter retaped;Blood drawn 2/12/2018 12:06 PM        Opportunity for questions and clarification was provided.       Patient transported with:   Monitor

## 2018-02-13 NOTE — PROGRESS NOTES
I have reviewed discharge instructions with the patient and spouse. The patient and spouse verbalized understanding. As discussed with APRN, pt plans to establish care with a PCP, so no f/u appt could be made. Pt agreed to follow up with same for a one week hospital follow up appt. Requested and received work excuse note. Pt and spouse verbalized appreciation for staff's care. Both state all belongings are in their possession upon discharge.

## 2018-02-13 NOTE — DISCHARGE SUMMARY
Discharge Summary       PATIENT ID: Mike Estrella  MRN: [id number]   YOB: 1980    DATE OF ADMISSION: 2/12/2018 12:00 PM    DATE OF DISCHARGE: 2/13/2018  PRIMARY CARE PROVIDER: Erin Kim MD       DISCHARGING PHYSICIAN: Min Vitale NP    To contact this individual call [phone] and ask the  to page. If unavailable ask to be transferred the Adult Hospitalist Department. CONSULTATIONS: IP CONSULT TO HOSPITALIST  IP CONSULT TO NEUROLOGY    PROCEDURES/SURGERIES: * No surgery found *    ADMITTING DIAGNOSES & HOSPITAL COURSE:   42year old male with PMH of Hypercholesterolemia, daily marijuana abuse, 1 PPD tobacco abuse,  and obesity who presented yesterday with confusion and dysarthria. He has a history of migraines in the past and has been actively working on his triglycerides and diet. He currently does not have any symptoms. Patient underwent complete neurologic work up and was evaluated by Neurology. Please see plan below. He was discharged in stable condition with outpatient follow up. DISCHARGE DIAGNOSES / PLAN:      TIA vs Transient Global Amnesia(POA)  CT and Brain MRI normal  LDL well controlled, Triglycerides high  Hgb A1C normal  Echo with bubble study reviewed   Discussed with Dr. Cole Scruggs, Will recommend full ASA and Statin at discharge  She recommended Carotid Duplex but pt refused to stay and receive.  We discussed at great lengths.     Dyslipidemia  Continue meds  Lifestyle modifications     History of Marijuana Abuse  Discussed at great length     History of Tobacco Abuse  Counseled    Disposition: Pt was given PCP list and we recommended setting up appt for him, but he will obtain once he gets paid       PENDING TEST RESULTS:   At the time of discharge the following test results are still pending: refused carotid duplex here    FOLLOW UP APPOINTMENTS:    Follow-up Information     Follow up With Details Comments Contact Info    Sami Chambers MD Schedule an appointment as soon as possible for a visit in 1 week hospital fu Patient can only remember the practice name and not the physician      Tito Lofton MD  for your information [address] [facility] [address]      Sammi Halsted, MD  psychiatrist [address] [facility]  [phone]           ADDITIONAL CARE RECOMMENDATIONS: Continue home meds as previous. You are doing a good job reducing your triglycerides with diet modifications. Continue with this. Exercise will help your lipid profile. Avoid Tobacco use and marijuana use  Aspirin increase to full aspirin 325 mg and reduce controllable risk factors  Please follow up with carotid duplex    DIET:low fat, low carb, reduced calories    ACTIVITY: resume    WOUND CARE: na    EQUIPMENT needed: na            DISCHARGE MEDICATIONS:  Current Discharge Medication List      START taking these medications    Details   aspirin (ASPIRIN) 325 mg tablet Take 1 Tab by mouth daily. Qty: 30 Tab, Refills: 3      pravastatin (PRAVACHOL) 40 mg tablet Take 1 Tab by mouth nightly. Qty: 30 Tab, Refills: 1         CONTINUE these medications which have NOT CHANGED    Details   Krill-OM3-DHA-EPA-OM6-Lip-Astx 1000-130(40-80) mg cap Take 1 Cap by mouth daily. L. acidoph & paracasei- S therm- Bifido (MICHELLE-Q/RISAQUAD) 8 billion cell cap cap Take 1 Cap by mouth daily. therapeutic multivitamin (THERAGRAN) tablet Take 1 Tab by mouth daily. b complex vitamins tablet Take 1 Tab by mouth daily. STOP taking these medications       aspirin delayed-release 81 mg tablet Comments:   Reason for Stopping:                 NOTIFY YOUR PHYSICIAN FOR ANY OF THE FOLLOWING:   Fever over 101 degrees for 24 hours. Chest pain, shortness of breath, fever, chills, nausea, vomiting, diarrhea, change in mentation, falling, weakness, bleeding. Severe pain or pain not relieved by medications.   Or, any other signs or symptoms that you may have questions about. DISPOSITION:    Home With:   OT  PT  HH  RN       Long term SNF/Inpatient Rehab   x Independent/assisted living    Hospice    Other:       PATIENT CONDITION AT DISCHARGE:     Functional status    Poor     Deconditioned    x Independent      Cognition    xx Lucid     Forgetful     Dementia      Catheters/lines (plus indication)    Figueroa     PICC     PEG    xx None      Code status    x Full code     DNR      PHYSICAL EXAMINATION AT DISCHARGE:     Constitutional:  No acute distress, cooperative, pleasant    ENT:  Oral mucous moist, oropharynx benign. Neck supple,    Resp:  CTA bilaterally. No wheezing/rhonchi/rales. No accessory muscle use   CV:  Regular rhythm, normal rate, no murmurs, gallops, rubs    GI:  Soft, non distended, non tender. normoactive bowel sounds, no hepatosplenomegaly     Musculoskeletal:  No edema, warm, 2+ pulses throughout    Neurologic:  Moves all extremities. AAOx3, PERRL, EOMS intact                                             Psych:  Good insight, Not anxious nor agitated. Skin:  Good turgor, no rashes or ulcers  Eyes:  EOMI. Anicteric sclerae, PERRL  Visit Vitals    /90 (BP 1 Location: Left arm, BP Patient Position: Sitting; At rest)    Pulse 69    Temp 97.8 °F (36.6 °C)    Resp 18    Ht 5' 9\" (1.753 m)    Wt 122.2 kg (269 lb 6.4 oz)    SpO2 98%    BMI 39.78 kg/m2           CHRONIC MEDICAL DIAGNOSES:  Problem List as of 2/13/2018  Date Reviewed: 2/13/2018          Codes Class Noted - Resolved    * (Principal)TIA (transient ischemic attack) ICD-10-CM: G45.9  ICD-9-CM: 435.9  2/12/2018 - Present        Abnormal EKG ICD-10-CM: R94.31  ICD-9-CM: 794.31  4/14/2014 - Present        Hyperlipidemia ICD-10-CM: E78.5  ICD-9-CM: 272.4  4/14/2014 - Present              Greater than 30 minutes were spent with the patient on counseling and coordination of care    Signed:   Tal Jaffe NP  2/13/2018  3:54 PM

## 2018-02-13 NOTE — PROGRESS NOTES
Shortly after neuro consult, pt told tech that he does not want further testing and just wants to go home. In asking Dr. Lise Johnsno, pt nor spouse mentioned same to her during consult. ROM Villanueva informed of same and will speak with pt and spouse and encourage compliance. Note echo results are still pending also.

## 2018-02-13 NOTE — CONSULTS
Neuro consult completed. Dictated note to follow. Possible TIA vs TGA. Pt has stroke/CV risk factors of Hyperlipidemia, HTN, Tobacco smoking, daily Marijuana smoking, Morbid obesity, probable YULI, amazing family h/o MI. Increase ASA 325mg. Start statin. CD ordered. Needs outpt PSG.

## 2018-02-13 NOTE — CONSULTS
Yaakov Quinones  MR#: [id number]  : 1980  ACCOUNT #: [de-identified]   DATE OF SERVICE: 2018    HISTORY OF PRESENT ILLNESS:  This is a 64-year-old right-handed male who presented to the emergency department yesterday with complaints of speech difficulty and confusion. The patient is seen with his wife at the bedside who aids in the history. Patient reports upon awakening yesterday he felt \"foggy. \"  He went ahead and went to work, writing it off as being just a Monday morning, was able to drive without any difficulty. 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 When he left that apartment, he was surprised to find that he was on the first floor. He would have sworn he was on a higher floor. He mentioned to a coworker he was not feeling well and they said he did not sound right when he was talking. He called his wife and she describes him as having an abnormal speech pattern, he just sounded not like his usual self. He kept repeating the same thing again and again, maybe not exactly in the same order, but would say what he had just said. This went on for about 3-5 minutes. He was not asking her repeated questions. They decided to present to the emergency department for further evaluation. Patient does have a history of migraines, but did not have a migraine yesterday, just woke up with his usual daily headache. He does not have any history of TIA or stroke, but does have multiple risk factors including appears to have undiagnosed hypertension with a blood pressure of 172/105 at presentation, hyperlipidemia with a history of triglycerides as high as 1300 back in , now down to 365, not on a statin, was on a statin in  when he saw a cardiologist according to our chart notes.   He smokes half a pack of cigarettes a day. He smokes marijuana daily. He snores. His wife has seen apneic events in the past, but not recently . Reports that he does not feel rested in the morning and wakes up with a headache every day. He has morbid obesity with a body mass index of 40. Patient does take an aspirin 81 mg a day at home. Reports that he is back to normal today without any complaints. According to chart notes, he was seen by teleneurology and had difficulty with naming in the triage, but in the ED itself, his NIH stroke score was 0. PAST MEDICAL HISTORY:  Hyperlipidemia, migraine headaches, tobacco abuse, marijuana abuse, morbid obesity. REVIEW OF SYSTEMS:  As per past medical history and HPI, otherwise reviewed and negative. MEDICATIONS AT HOME:  Aspirin 81 mg a day, B complex vitamin, multivitamin, Krill oil along with other supplements. ALLERGIES:  NONE. SOCIAL HISTORY:  He is , works at apartment complex doing maintenance. He smokes half a pack of cigarettes a day. Smokes marijuana daily. Occasionally drinks alcohol. FAMILY HISTORY:  Father  with liver failure, but had a history of multiple MIs. Paternal grandmother, paternal grandfather, and paternal uncle all with MIs. Mother is living. She has a pacemaker, tobacco abuse. Maternal aunt with TIAs. No siblings. He has 3 daughters, one with migraine headaches. PHYSICAL EXAMINATION  VITAL SIGNS:  Blood pressure currently 166/90, pulse 69, respiratory rate 18, satting 98% on room air, temperature is 97.8. GENERAL:  He is a morbidly obese male sitting in bed in no distress. HEENT:  Mallampati class 4 posterior oropharynx, unable to visualize his airway. HEART:  Has a regular rate and rhythm without murmurs. Carotids are 2+. No bruits. EXTREMITIES:  Warm. No edema. He has 2+ radial pulses. NEUROLOGIC:  Mental status, he is alert. He is oriented x4. Speech and language are intact.   Attention, memory and fund of knowledge are appropriate. His cranial nerve examination, he has no facial asymmetry. No ptosis. Extraocular eye movements are intact without diplopia or nystagmus. Visual fields full. Pupils equally round and reactive. Tongue is midline. Palate elevates symmetrically. Trapezius and sternocleidomastoid are 5/5. His motor exam is 5/5 strength. No pronator drift. No tremor. Sensory exam is intact to light touch and pinprick throughout. Reflexes were 2+ and symmetric. Toes are downgoing. His coordination was intact finger-to-nose, heel-to-shin, rapid alternating movements. Gait not assessed at this time. STUDIES AND REPORTS:  Echocardiogram on 04/28/2014 of EF 55-60%, was noted to have a trabeculated apex. CT of the head is unremarkable. MRI of the brain is unremarkable. CMP unremarkable. Urine drug screen positive for THC. Glucose 116. INR 4.6 for unclear reasons, this was not repeated. Hemoglobin A1c is 5.6. LDL 35. ASSESSMENT AND PLAN:  This is a 38-year-old right-handed male presenting with intermittent confusion or difficulty remembering yesterday, repeating himself, though not asking repeated questions on the phone with his wife for about 3-5 minutes with multiple ischemic stroke risk factors including tobacco abuse, marijuana abuse, hyperlipidemia, hypertension that is undiagnosed, morbid obesity and probable obstructive sleep apnea with a nonfocal exam and a BMI of 39.78 and Mallampati class 4 posterior oropharynx. Suspect transient ischemic attack versus transient global amnesia. Echocardiogram result is pending. I will order carotid Dopplers given all of these risk factors to assess for evidence of stenosis. Patient was instructed to stop smoking marijuana and tobacco.  Recommend he undergo an outpatient polysomnogram for obstructive sleep apnea. Recommend increasing his aspirin to 325 mg a day and starting a statin.   If echo and carotid Dopplers are unremarkable, he could be discharged today with followup with his primary care provider for management of the stroke risk factors and neurology as needed.       MD Prashant Allan / Susie May  D: 02/13/2018 14:03     T: 02/13/2018 14:31  JOB #: [id number]

## 2018-02-13 NOTE — PROGRESS NOTES
Primary Nurse Chuy Gamez RN and Tamiko Pierre RN performed a dual skin assessment on this patient No impairment noted  Kaushal score is 21      0730 Bedside and Verbal shift change report given to [facility] (oncoming nurse) by Wendy Koch (offgoing nurse). Report included the following information SBAR, Kardex and Recent Results.

## 2018-02-13 NOTE — PROGRESS NOTES
Occupational Therapy Note 11:22  2/13/18     Discussed case with PT and pt. Pt was IND with all activities and does not require a formal skilled acute care OT evaluation at this time. He is able to go home without any skilled OT needs. Thanks.     Ana Riley, OT

## 2018-03-01 ENCOUNTER — OFFICE VISIT (OUTPATIENT)
Dept: SLEEP MEDICINE | Age: 38
End: 2018-03-01

## 2018-03-01 VITALS
HEIGHT: 69 IN | HEART RATE: 72 BPM | BODY MASS INDEX: 40.73 KG/M2 | WEIGHT: 275 LBS | OXYGEN SATURATION: 96 % | DIASTOLIC BLOOD PRESSURE: 84 MMHG | SYSTOLIC BLOOD PRESSURE: 122 MMHG

## 2018-03-01 DIAGNOSIS — Z86.73 H/O: STROKE: ICD-10-CM

## 2018-03-01 DIAGNOSIS — E66.01 OBESITY, MORBID (HCC): ICD-10-CM

## 2018-03-01 DIAGNOSIS — G47.33 OSA (OBSTRUCTIVE SLEEP APNEA): Primary | ICD-10-CM

## 2018-03-01 NOTE — PATIENT INSTRUCTIONS
217 [facility] [place]. [place], [address]  Tel.  [phone]  Fax. 100 [facility] 60  [place], [address] [facility]  Tel.  [phone]  Fax. [phone] [address]  Tel.  [phone]  Fax. [phone]     Sleep Apnea: After Your Visit  Your Care Instructions  Sleep apnea occurs when you frequently stop breathing for 10 seconds or longer during sleep. It can be mild to severe, based on the number of times per hour that you stop breathing or have slowed breathing. Blocked or narrowed airways in your nose, mouth, or throat can cause sleep apnea. Your airway can become blocked when your throat muscles and tongue relax during sleep. Sleep apnea is common, occurring in 1 out of 20 individuals. Individuals having any of the following characteristics should be evaluated and treated right away due to high risk and detrimental consequences from untreated sleep apnea:  1. Obesity  2. Congestive Heart failure  3. Atrial Fibrillation  4. Uncontrolled Hypertension  5. Type II Diabetes  6. Night-time Arrhythmias  7. Stroke  8. Pulmonary Hypertension  9. High-risk Driving Populations (pilots, truck drivers, etc.)  10. Patients Considering Weight-loss Surgery    How do you know you have sleep apnea? You probably have sleep apnea if you answer 'yes' to 3 or more of the following questions:  S - Have you been told that you Snore? T - Are you often Tired during the day? O - Has anyone Observed you stop breathing while sleeping? P- Do you have (or are being treated for) high blood Pressure? B - Are you obese (Body Mass Index > 35)? A - Is your Age 48years old or older? N - Is your Neck size greater than 16 inches? G - Are you male Gender? A sleep physician can prescribe a breathing device that prevents tissues in the throat from blocking your airway.  Or your doctor may recommend using a dental device (oral breathing device) to help keep your airway open. In some cases, surgery may be needed to remove enlarged tissues in the throat. Follow-up care is a key part of your treatment and safety. Be sure to make and go to all appointments, and call your doctor if you are having problems. It's also a good idea to know your test results and keep a list of the medicines you take. How can you care for yourself at home? · Lose weight, if needed. It may reduce the number of times you stop breathing or have slowed breathing. · Go to bed at the same time every night. · Sleep on your side. It may stop mild apnea. If you tend to roll onto your back, sew a pocket in the back of your pajama top. Put a tennis ball into the pocket, and stitch the pocket shut. This will help keep you from sleeping on your back. · Avoid alcohol and medicines such as sleeping pills and sedatives before bed. · Do not smoke. Smoking can make sleep apnea worse. If you need help quitting, talk to your doctor about stop-smoking programs and medicines. These can increase your chances of quitting for good. · Prop up the head of your bed 4 to 6 inches by putting bricks under the legs of the bed. · Treat breathing problems, such as a stuffy nose, caused by a cold or allergies. · Use a continuous positive airway pressure (CPAP) breathing machine if lifestyle changes do not help your apnea and your doctor recommends it. The machine keeps your airway from closing when you sleep. · If CPAP does not help you, ask your doctor whether you should try other breathing machines. A bilevel positive airway pressure machine has two types of air pressureâone for breathing in and one for breathing out. Another device raises or lowers air pressure as needed while you breathe. · If your nose feels dry or bleeds when using one of these machines, talk with your doctor about increasing moisture in the air. A humidifier may help.   · If your nose is runny or stuffy from using a breathing machine, talk with your doctor about using decongestants or a corticosteroid nasal spray. When should you call for help? Watch closely for changes in your health, and be sure to contact your doctor if:  · You still have sleep apnea even though you have made lifestyle changes. · You are thinking of trying a device such as CPAP. · You are having problems using a CPAP or similar machine. Where can you learn more? Go to Netbooks. Enter J761 in the search box to learn more about \"Sleep Apnea: After Your Visit. \"   © 7060-0509 Healthwise, Incorporated. Care instructions adapted under license by [facility] [employer] (which disclaims liability or warranty for this information). This care instruction is for use with your licensed healthcare professional. If you have questions about a medical condition or this instruction, always ask your healthcare professional. Nonda Ernesto any warranty or liability for your use of this information. PROPER SLEEP HYGIENE    What to avoid  · Do not have drinks with caffeine, such as coffee or black tea, for 8 hours before bed. · Do not smoke or use other types of tobacco near bedtime. Nicotine is a stimulant and can keep you awake. · Avoid drinking alcohol late in the evening, because it can cause you to wake in the middle of the night. · Do not eat a big meal close to bedtime. If you are hungry, eat a light snack. · Do not drink a lot of water close to bedtime, because the need to urinate may wake you up during the night. · Do not read or watch TV in bed. Use the bed only for sleeping and sexual activity. What to try  · Go to bed at the same time every night, and wake up at the same time every morning. Do not take naps during the day. · Keep your bedroom quiet, dark, and cool. · Get regular exercise, but not within 3 to 4 hours of your bedtime. .  · Sleep on a comfortable pillow and mattress.   · If watching the clock makes you anxious, turn it facing away from you so you cannot see the time. · If you worry when you lie down, start a worry book. Well before bedtime, write down your worries, and then set the book and your concerns aside. · Try meditation or other relaxation techniques before you go to bed. · If you cannot fall asleep, get up and go to another room until you feel sleepy. Do something relaxing. Repeat your bedtime routine before you go to bed again. · Make your house quiet and calm about an hour before bedtime. Turn down the lights, turn off the TV, log off the computer, and turn down the volume on music. This can help you relax after a busy day. Drowsy Driving  The [address] [place] [address] Road Traffic Safety Administration cites drowsiness as a causing factor in more than 908,783 police reported crashes annually, resulting in 76,000 injuries and 1,500 deaths. Other surveys suggest 55% of people polled have driven while drowsy in the past year, 23% had fallen asleep but not crashed, 3% crashed, and 2% had and accident due to drowsy driving. Who is at risk? Young Drivers: One study of drowsy driving accidents states that 55% of the drivers were under 25 years. Of those, 75% were male. Shift Workers and Travelers: People who work overnight or travel across time zones frequently are at higher risk of experiencing Circadian Rhythm Disorders. They are trying to work and function when their body is programed to sleep. Sleep Deprived: Lack of sleep has a serious impact on your ability to pay attention or focus on a task. Consistently getting less than the average of 8 hours your body needs creates partial or cumulative sleep deprivation. Untreated Sleep Disorders: Sleep Apnea, Narcolepsy, R.L.S., and other sleep disorders (untreated) prevent a person from getting enough restful sleep. This leads to excessive daytime sleepiness and increases the risk for drowsy driving accidents by up to 7 times.   Medications / Alcohol: Even over the counter medications can cause drowsiness. Medications that impair a drivers attention should have a warning label. Alcohol naturally makes you sleepy and on its own can cause accidents. Combined with excessive drowsiness its effects are amplified. Signs of Drowsy Driving:   * You don't remember driving the last few miles   * You may drift out of your riaz   * You are unable to focus and your thoughts wander   * You may yawn more often than normal   * You have difficulty keeping your eyes open / nodding off   * Missing traffic signs, speeding, or tailgating  Prevention-   Good sleep hygiene, lifestyle and behavioral choices have the most impact on drowsy driving. There is no substitute for sleep and the average person requires 8 hours nightly. If you find yourself driving drowsy, stop and sleep. Consider the sleep hygiene tips provided during your visit as well. Medication Refill Policy: Refills for all medications require 1 week advance notice. Please have your pharmacy fax a refill request. We are unable to fax, or call in \"controled substance\" medications and you will need to pick these prescriptions up from our office. Smarkets Activation    Thank you for requesting access to Smarkets. Please follow the instructions below to securely access and download your online medical record. Smarkets allows you to send messages to your doctor, view your test results, renew your prescriptions, schedule appointments, and more. How Do I Sign Up? 1. In your internet browser, go to [email or web address]. 2. Click on the First Time User? Click Here link in the Sign In box. You will see the New Member Sign Up page. 3. Enter your Smarkets Access Code exactly as it appears below. You will not need to use this code after youve completed the sign-up process. If you do not sign up before the expiration date, you must request a new code. Smarkets Access Code:  Activation code not generated  Current Smarkets Status: Active (This is the date your Civicon access code will )    4. Enter the last four digits of your Social Security Number (xxxx) and Date of Birth (mm/dd/yyyy) as indicated and click Submit. You will be taken to the next sign-up page. 5. Create a Enerplantt ID. This will be your Civicon login ID and cannot be changed, so think of one that is secure and easy to remember. 6. Create a Civicon password. You can change your password at any time. 7. Enter your Password Reset Question and Answer. This can be used at a later time if you forget your password. 8. Enter your e-mail address. You will receive e-mail notification when new information is available in [address] 9. Click Sign Up. You can now view and download portions of your medical record. 10. Click the Download Summary menu link to download a portable copy of your medical information. Additional Information    If you have questions, please call [phone]. Remember, Civicon is NOT to be used for urgent needs. For medical emergencies, dial 911.

## 2018-03-01 NOTE — PROGRESS NOTES
217 [facility]., [place]. [place], [address]  Tel.  [phone]  Fax. 100 [facility] 60  [place], [address] [facility]  Tel.  [phone]  Fax. [phone] [address] [facility] [address]  Tel.  [phone]  Fax. [phone]         Subjective:      [facility] is an 42 y.o. male referred for evaluation for a sleep disorder. He complains of snoring associated with completely or partially paralyzed while falling asleep or waking up. Symptoms began several years ago, gradually worsening since that time. He usually can fall asleep in 5 minutes. Family or house members note snoring. He denies completely or partially paralyzed while falling asleep or waking up. [facility] does not wake up frequently at night. He is bothered by waking up too early and left unable to get back to sleep. He actually sleeps about 6 hours at night and wakes up about 3 times during the night. He does work shifts:  First Shift. Jennifer Lombardi indicates he does get too little sleep at night. His bedtime is 2300. He awakens at 0600. He does not take naps. He has the following observed behaviors: Loud snoring, Light snoring;  . Other remarks:   He denies of an urge to move extremities due to discomfort or other sensation and denies of dream enactment behavior. He reports of waking up feeling disoriented and foggy, has slurred speech - this lead to a 48 hour hospitalization about 2 weeks previously with the diagnosis of stroke. [place] Sleepiness Score: 13 which reflect moderate daytime drowsiness. No Known Allergies      Current Outpatient Prescriptions:     Omega-3 Fatty Acids 60- mg cpDR, Take  by mouth., Disp: , Rfl:     aspirin (ASPIRIN) 325 mg tablet, Take 1 Tab by mouth daily. , Disp: 30 Tab, Rfl: 3    pravastatin (PRAVACHOL) 40 mg tablet, Take 1 Tab by mouth nightly., Disp: 30 Tab, Rfl: 1    Krill-OM3-DHA-EPA-OM6-Lip-Astx 1000-130(40-80) mg cap, Take 1 Cap by mouth daily., Disp: , Rfl:     therapeutic multivitamin (THERAGRAN) tablet, Take 1 Tab by mouth daily. , Disp: , Rfl:     b complex vitamins tablet, Take 1 Tab by mouth daily. , Disp: , Rfl:     L. acidoph & paracasei- S therm- Bifido (MICHELLE-Q/RISAQUAD) 8 billion cell cap cap, Take 1 Cap by mouth daily. , Disp: , Rfl:      He  has a past medical history of Hyperlipidemia. He  has no past surgical history on file. He family history includes Heart Disease in his mother; Liver Disease in his father. He  reports that he has been smoking. He has been smoking about 0.50 packs per day. He has never used smokeless tobacco. He reports that he drinks alcohol. He reports that he does not use illicit drugs. Review of Systems:  Constitutional:  No significant weight loss or weight gain  Eyes:  No blurred vision  CVS:  No significant chest pain  Pulm:  No significant shortness of breath  GI:  No significant nausea or vomiting  :  significant nocturia  Musculoskeletal:  No significant joint pain at night  Skin:  No significant rashes  Neuro:  No significant dizziness   Psych:  No active mood issues    Sleep Review of Systems: notable for no difficulty falling asleep; infrequent awakenings at night;  regular dreaming not reported; no nightmares ;  early morning headaches; no memory problems; no concentration issues; no history of any automobile or occupational accidents due to daytime drowsiness. Objective:     Visit Vitals    /84    Pulse 72    Ht 5' 9\" (1.753 m)    Wt 275 lb (124.7 kg)    SpO2 96%    BMI 40.61 kg/m2         General:   Not in acute distress   Eyes:  Anicteric sclerae, no obvious strabismus   Nose:  No obvious nasal septum deviation    Oropharynx:   Class 4 oropharyngeal outlet, thick tongue base, uvula could not be seen due to low-lying soft palate, narrow tonsilo-pharyngeal pilars   Tonsils:   tonsils are not seen due to low-lying soft palate   Neck:   Neck circ.  in \"inches\": 16.5; midline trachea   Chest/Lungs:  Equal lung expansion, clear on auscultation    CVS:  Normal rate, regular rhythm; no JVD   Skin:  Warm to touch; no obvious rashes   Neuro:  No focal deficits ; no obvious tremor    Psych:  Normal affect,  normal countenance;          Assessment:       ICD-10-CM ICD-9-CM    1. YULI (obstructive sleep apnea) G47.33 327.23 POLYSOMNOGRAPHY 1 NIGHT   2. BMI 40.0-44.9, adult ([facility] Z68.41 V85.41    3. Obesity, morbid ([facility] E66.01 278.01    4. H/O: stroke Z86.73 V12.54          Plan:     * The patient currently has a Moderate Risk for having sleep apnea. STOP-BANG score 5.  * Sleep testing was ordered for initial evaluation. Orders Placed This Encounter    POLYSOMNOGRAPHY 1 NIGHT     Standing Status:   Future     Standing Expiration Date:   8/30/2018     Scheduling Instructions:      Perform ETCO2 monitoring during Polysomnography - Do not split. Order Specific Question:   Reason for Exam     Answer:   YULI / OHS       * He was provided information on sleep apnea including coresponding risk factors and the importance of proper treatment. * Treatment options if indicated were reviewed today. Patient agrees to a trial of PAP therapy if indicated. * Counseling was provided regarding proper sleep hygiene (including effect of light on sleep), smoking cessation and safe driving. * Effect of sleep disturbance on weight was reviewed. We have recommended a dedicated weight loss through appropriate diet and an exercise regiment as significant weight reduction has been shown to reduce severity of obstructive sleep apnea. * Patient agrees to telephone [phone]  follow-up by myself or lead sleep technologist shortly after sleep study to review results and plan final management.     (patient has given permission for a message to be left regarding test results and further management if patient cannot be cannot be reached directly).       Thank you for allowing us to participate in your patient's medical care. We'll keep you updated on these investigations. Demian Melgar MD, FAASM  Electronically signed.  03/01/18

## 2018-04-06 ENCOUNTER — HOSPITAL ENCOUNTER (OUTPATIENT)
Dept: SLEEP MEDICINE | Age: 38
Discharge: HOME | End: 2018-04-06
Payer: COMMERCIAL

## 2018-04-06 VITALS
HEART RATE: 89 BPM | WEIGHT: 270.9 LBS | DIASTOLIC BLOOD PRESSURE: 91 MMHG | SYSTOLIC BLOOD PRESSURE: 144 MMHG | HEIGHT: 69 IN | OXYGEN SATURATION: 98 % | BODY MASS INDEX: 40.12 KG/M2

## 2018-04-06 DIAGNOSIS — G47.33 OSA (OBSTRUCTIVE SLEEP APNEA): ICD-10-CM

## 2018-04-06 PROCEDURE — 95810 POLYSOM 6/> YRS 4/> PARAM: CPT | Performed by: INTERNAL MEDICINE

## 2018-04-09 ENCOUNTER — TELEPHONE (OUTPATIENT)
Dept: SLEEP MEDICINE | Age: 38
End: 2018-04-09

## 2018-04-09 DIAGNOSIS — G47.33 OSA (OBSTRUCTIVE SLEEP APNEA): Primary | ICD-10-CM

## 2018-04-16 ENCOUNTER — DOCUMENTATION ONLY (OUTPATIENT)
Dept: SLEEP MEDICINE | Age: 38
End: 2018-04-16

## 2018-04-16 NOTE — TELEPHONE ENCOUNTER
Results of Sleep Testing, PAP prescription and follow-up discussed with patient. Patient encouraged to call if there were any further questions regarding sleep symptoms. Encounter Diagnosis   Name Primary?  YULI (obstructive sleep apnea) Yes       Orders Placed This Encounter    AMB SUPPLY ORDER     Diagnosis: Obstructive Sleep Apnea ICD-10 Code (G47.33)    Positive Airway Pressure Therapy: Duration of need: 99 months. ResMed APAP Device: Minimum Pressure: 4 cmH2O, Maximum Pressure: 20 cmH2O. Ramp Time: 30 Minutes. EPR: 2. CPAP mask -  Patient preference, headgear, tubing, and filter;  heated humidifier; wireless modem. Remote monitoring enrollment. Olive Delgadillo MD, FAASM; NPI: [id number]  Electronically signed. 04/16/18

## 2018-04-23 NOTE — TELEPHONE ENCOUNTER
Provided DME info to patient. He states he will call the office back to schedule follow up when he gets his machine.

## 2021-04-21 ENCOUNTER — OFFICE VISIT (OUTPATIENT)
Dept: SURGERY | Age: 41
End: 2021-04-21
Payer: COMMERCIAL

## 2021-04-21 VITALS
HEIGHT: 69 IN | HEART RATE: 95 BPM | DIASTOLIC BLOOD PRESSURE: 86 MMHG | OXYGEN SATURATION: 97 % | RESPIRATION RATE: 20 BRPM | TEMPERATURE: 98.5 F | BODY MASS INDEX: 41.69 KG/M2 | WEIGHT: 281.5 LBS | SYSTOLIC BLOOD PRESSURE: 126 MMHG

## 2021-04-21 DIAGNOSIS — K42.9 UMBILICAL HERNIA WITHOUT OBSTRUCTION AND WITHOUT GANGRENE: Primary | ICD-10-CM

## 2021-04-21 PROCEDURE — 99204 OFFICE O/P NEW MOD 45 MIN: CPT | Performed by: SURGERY

## 2021-04-21 RX ORDER — LISINOPRIL 5 MG/1
TABLET ORAL
COMMUNITY
Start: 2021-04-16

## 2021-04-21 NOTE — PROGRESS NOTES
General Surgery Office Consultation / H & P    CC: Umbilical hernia  History of Present Illness:      Francisco Dillon is a 41 y.o. male who presents with painful periumbilical area. Patient reports that about a year ago he developed pain in his umbilicus. He was able to nourished along for a long while. 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 It was sharp 1010. Brought him to his knees. No radiation. Lifting provokes the pain. Pain medication and laying down helps. [facility]  They diagnosed an umbilical hernia and sent him to me. His pain still persists. It is dull now but still present. He is definitely guarding his bellybutton. No nausea or vomiting. No prior belly surgery. He does vape occasionally. He reports significant weight gain over the last year with Covid. Past Medical History:   Diagnosis Date    Hyperlipidemia      No past surgical history on file. Family History   Problem Relation Age of Onset    Heart Disease Mother     Liver Disease Father      Social History     Socioeconomic History    Marital status:      Spouse name: Not on file    Number of children: Not on file    Years of education: Not on file    Highest education level: Not on file   Tobacco Use    Smoking status: Former Smoker     Packs/day: 0.50    Smokeless tobacco: Never Used   Substance and Sexual Activity    Alcohol use: Not Currently     Comment: rarely     Drug use: No      Prior to Admission medications    Medication Sig Start Date End Date Taking? Authorizing Provider   lisinopriL (PRINIVIL, ZESTRIL) 5 mg tablet TAKE 1 TABLET BY MOUTH DAILY 4/16/21  Yes Provider, Historical   Omega-3 Fatty Acids 60- mg cpDR Take  by mouth. Yes Provider, Historical   aspirin (ASPIRIN) 325 mg tablet Take 1 Tab by mouth daily.  2/14/18  Yes Prabhjot Marsh NP Krill-OM3-DHA-EPA-OM6-Lip-Astx 1000-130(40-80) mg cap Take 1 Cap by mouth daily. Yes Provider, Historical   L. acidoph & paracasei- S therm- Bifido (MICHELLE-Q/RISAQUAD) 8 billion cell cap cap Take 1 Cap by mouth daily. Yes Provider, Historical   therapeutic multivitamin (THERAGRAN) tablet Take 1 Tab by mouth daily. Yes Provider, Historical   b complex vitamins tablet Take 1 Tab by mouth daily. Yes Provider, Historical   pravastatin (PRAVACHOL) 40 mg tablet Take 1 Tab by mouth nightly. 2/13/18   Fariba Wilson NP     No Known Allergies    Review of Systems:  Constitutional: No fever or chills  Neurologic: No headache  Eyes: No scleral icterus or irritated eyes  Nose: No nasal pain or drainage  Mouth: No oral lesions or sore throat  Cardiac: No palpations or chest pain  Pulmonary: No cough or shortness or breath  Gastrointestinal: Umbilical pain, no nausea, emesis, diarrhea, or constipation  Genitourinary: No dysuria  Musculoskeletal: No muscle or joint tenderness  Skin: No rashes or lesions  Psychiatric: No anxiety or depressed mood    Physical Exam:     Visit Vitals  /86   Pulse 95   Temp 98.5 °F (36.9 °C)   Resp 20   Ht 5' 9\" (1.753 m)   Wt 281 lb 8 oz (127.7 kg)   SpO2 97%   BMI 41.57 kg/m²     General: No acute distress, conversant  Eyes: PERRLA, no scleral icterus  HENT: Normocephalic without oral lesions  Neck: Trachea midline without LAD  Cardiac: Normal pulse rate and rhythm  Pulmonary: Symmetric chest rise with normal effort  GI: Soft, obese, tenderness in the periumbilical area, small umbilical hernia palpated, no incarceration, no splenomegaly  Skin: Warm without rash  Extremities: No edema or joint stiffness  Psych: Appropriate mood and affect    Assessment:     39-year-old male symptomatic umbilical hernia    Plan:     Patient is symptomatic from the small umbilical hernia. He does a fair amount of lifting in his job.   Patient will be best served with a repair with mesh we discussed the pros and cons of open versus laparoscopic repair. I recommended laparoscopic repair with mesh. We discussed the risks and benefits include bleeding, infection, mesh infection, bowel injury, anesthesia risk which include MI, stroke, death. The patient understands these risks and elects to proceed. We did also discuss further imaging versus surgery out right. I think the hernia is small. I do not see a discrete benefit of doing further imaging and the patient agrees with that plan. We will proceed with surgery when available. We also explicitly discussed his weight. Although it is best to have someone less than a BMI of 35 for surgery, given the patient's symptoms he needs to undergo a more urgent repair. I discussed with the patient and he is at higher risk of recurrence given his weight and I suggest significant weight loss going forward. His tobacco use also puts him at higher risk of complications including wound infection and mesh infection along with failure of the repair. Total time involved with this patient's care was: 45 minutes. This involved reviewing patient record, talking with patient, and charting on patient.     Signed By: Matias Mccarthy MD  Bariatric and General Surgeon  [facility]    April 21, 2021

## 2021-04-21 NOTE — H&P (VIEW-ONLY)
General Surgery Office Consultation / H & P    CC: Umbilical hernia  History of Present Illness:      Idalmis Gary is a 41 y.o. male who presents with painful periumbilical area. Patient reports that about a year ago he developed pain in his umbilicus. He was able to nourished along for a long while. 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 It was sharp 1010. Brought him to his knees. No radiation. Lifting provokes the pain. Pain medication and laying down helps. [facility]  They diagnosed an umbilical hernia and sent him to me. His pain still persists. It is dull now but still present. He is definitely guarding his bellybutton. No nausea or vomiting. No prior belly surgery. He does vape occasionally. He reports significant weight gain over the last year with Covid. Past Medical History:   Diagnosis Date    Hyperlipidemia      No past surgical history on file. Family History   Problem Relation Age of Onset    Heart Disease Mother     Liver Disease Father      Social History     Socioeconomic History    Marital status:      Spouse name: Not on file    Number of children: Not on file    Years of education: Not on file    Highest education level: Not on file   Tobacco Use    Smoking status: Former Smoker     Packs/day: 0.50    Smokeless tobacco: Never Used   Substance and Sexual Activity    Alcohol use: Not Currently     Comment: rarely     Drug use: No      Prior to Admission medications    Medication Sig Start Date End Date Taking? Authorizing Provider   lisinopriL (PRINIVIL, ZESTRIL) 5 mg tablet TAKE 1 TABLET BY MOUTH DAILY 4/16/21  Yes Provider, Historical   Omega-3 Fatty Acids 60- mg cpDR Take  by mouth. Yes Provider, Historical   aspirin (ASPIRIN) 325 mg tablet Take 1 Tab by mouth daily.  2/14/18  Yes Alisha De La Torre NP Krill-OM3-DHA-EPA-OM6-Lip-Astx 1000-130(40-80) mg cap Take 1 Cap by mouth daily. Yes Provider, Historical   L. acidoph & paracasei- S therm- Bifido (MICHELLE-Q/RISAQUAD) 8 billion cell cap cap Take 1 Cap by mouth daily. Yes Provider, Historical   therapeutic multivitamin (THERAGRAN) tablet Take 1 Tab by mouth daily. Yes Provider, Historical   b complex vitamins tablet Take 1 Tab by mouth daily. Yes Provider, Historical   pravastatin (PRAVACHOL) 40 mg tablet Take 1 Tab by mouth nightly. 2/13/18   Gerardo Ramey NP     No Known Allergies    Review of Systems:  Constitutional: No fever or chills  Neurologic: No headache  Eyes: No scleral icterus or irritated eyes  Nose: No nasal pain or drainage  Mouth: No oral lesions or sore throat  Cardiac: No palpations or chest pain  Pulmonary: No cough or shortness or breath  Gastrointestinal: Umbilical pain, no nausea, emesis, diarrhea, or constipation  Genitourinary: No dysuria  Musculoskeletal: No muscle or joint tenderness  Skin: No rashes or lesions  Psychiatric: No anxiety or depressed mood    Physical Exam:     Visit Vitals  /86   Pulse 95   Temp 98.5 °F (36.9 °C)   Resp 20   Ht 5' 9\" (1.753 m)   Wt 281 lb 8 oz (127.7 kg)   SpO2 97%   BMI 41.57 kg/m²     General: No acute distress, conversant  Eyes: PERRLA, no scleral icterus  HENT: Normocephalic without oral lesions  Neck: Trachea midline without LAD  Cardiac: Normal pulse rate and rhythm  Pulmonary: Symmetric chest rise with normal effort  GI: Soft, obese, tenderness in the periumbilical area, small umbilical hernia palpated, no incarceration, no splenomegaly  Skin: Warm without rash  Extremities: No edema or joint stiffness  Psych: Appropriate mood and affect    Assessment:     53-year-old male symptomatic umbilical hernia    Plan:     Patient is symptomatic from the small umbilical hernia. He does a fair amount of lifting in his job.   Patient will be best served with a repair with mesh we discussed the pros and cons of open versus laparoscopic repair. I recommended laparoscopic repair with mesh. We discussed the risks and benefits include bleeding, infection, mesh infection, bowel injury, anesthesia risk which include MI, stroke, death. The patient understands these risks and elects to proceed. We did also discuss further imaging versus surgery out right. I think the hernia is small. I do not see a discrete benefit of doing further imaging and the patient agrees with that plan. We will proceed with surgery when available. We also explicitly discussed his weight. Although it is best to have someone less than a BMI of 35 for surgery, given the patient's symptoms he needs to undergo a more urgent repair. I discussed with the patient and he is at higher risk of recurrence given his weight and I suggest significant weight loss going forward. His tobacco use also puts him at higher risk of complications including wound infection and mesh infection along with failure of the repair. Total time involved with this patient's care was: 45 minutes. This involved reviewing patient record, talking with patient, and charting on patient.     Signed By: Samantha Reynolds MD  Bariatric and General Surgeon  [facility]    April 21, 2021

## 2021-04-21 NOTE — PROGRESS NOTES
1. Have you been to the ER, urgent care clinic since your last visit? Hospitalized since your last visit? new patient    2. Have you seen or consulted any other health care providers outside of the [address] [place] [address] since your last visit? Include any pap smears or colon screening.  New patient

## 2021-05-07 ENCOUNTER — TRANSCRIBE ORDER (OUTPATIENT)
Dept: REGISTRATION | Age: 41
End: 2021-05-07

## 2021-05-07 ENCOUNTER — HOSPITAL ENCOUNTER (OUTPATIENT)
Dept: PREADMISSION TESTING | Age: 41
Discharge: HOME | End: 2021-05-07
Payer: COMMERCIAL

## 2021-05-07 DIAGNOSIS — Z01.812 PRE-PROCEDURE LAB EXAM: Primary | ICD-10-CM

## 2021-05-07 DIAGNOSIS — Z01.812 PRE-PROCEDURE LAB EXAM: ICD-10-CM

## 2021-05-07 PROCEDURE — U0003 INFECTIOUS AGENT DETECTION BY NUCLEIC ACID (DNA OR RNA); SEVERE ACUTE RESPIRATORY SYNDROME CORONAVIRUS 2 (SARS-COV-2) (CORONAVIRUS DISEASE [COVID-19]), AMPLIFIED PROBE TECHNIQUE, MAKING USE OF HIGH THROUGHPUT TECHNOLOGIES AS DESCRIBED BY CMS-2020-01-R: HCPCS

## 2021-05-08 LAB — SARS-COV-2, COV2NT: NOT DETECTED

## 2021-05-11 ENCOUNTER — ANESTHESIA EVENT (OUTPATIENT)
Dept: SURGERY | Age: 41
End: 2021-05-11
Payer: COMMERCIAL

## 2021-05-11 ENCOUNTER — ANESTHESIA (OUTPATIENT)
Dept: SURGERY | Age: 41
End: 2021-05-11
Payer: COMMERCIAL

## 2021-05-11 ENCOUNTER — HOSPITAL ENCOUNTER (OUTPATIENT)
Age: 41
Setting detail: OUTPATIENT SURGERY
Discharge: HOME | End: 2021-05-11
Attending: SURGERY | Admitting: SURGERY
Payer: COMMERCIAL

## 2021-05-11 VITALS
TEMPERATURE: 97.6 F | SYSTOLIC BLOOD PRESSURE: 123 MMHG | HEIGHT: 69 IN | BODY MASS INDEX: 41.47 KG/M2 | RESPIRATION RATE: 14 BRPM | DIASTOLIC BLOOD PRESSURE: 82 MMHG | OXYGEN SATURATION: 97 % | WEIGHT: 280 LBS | HEART RATE: 70 BPM

## 2021-05-11 DIAGNOSIS — K42.9 UMBILICAL HERNIA WITHOUT OBSTRUCTION AND WITHOUT GANGRENE: Primary | ICD-10-CM

## 2021-05-11 PROCEDURE — 77030002933 HC SUT MCRYL J&J -A: Performed by: SURGERY

## 2021-05-11 PROCEDURE — 74011000250 HC RX REV CODE- 250: Performed by: SURGERY

## 2021-05-11 PROCEDURE — 76060000033 HC ANESTHESIA 1 TO 1.5 HR: Performed by: SURGERY

## 2021-05-11 PROCEDURE — 77030040361 HC SLV COMPR DVT MDII -B: Performed by: SURGERY

## 2021-05-11 PROCEDURE — 77030008603 HC TRCR ENDOSC EPATH J&J -C: Performed by: SURGERY

## 2021-05-11 PROCEDURE — 77030034154 HC SHR COAG HARM ACE J&J -F: Performed by: SURGERY

## 2021-05-11 PROCEDURE — 74011250637 HC RX REV CODE- 250/637: Performed by: ANESTHESIOLOGY

## 2021-05-11 PROCEDURE — 77030040922 HC BLNKT HYPOTHRM STRY -A

## 2021-05-11 PROCEDURE — 77030042142 HC TRCR ENDOSC -B: Performed by: SURGERY

## 2021-05-11 PROCEDURE — 2709999900 HC NON-CHARGEABLE SUPPLY: Performed by: SURGERY

## 2021-05-11 PROCEDURE — 76210000016 HC OR PH I REC 1 TO 1.5 HR: Performed by: SURGERY

## 2021-05-11 PROCEDURE — 77030039895 HC SYST SMK EVAC LAP COVD -B: Performed by: SURGERY

## 2021-05-11 PROCEDURE — 74011250636 HC RX REV CODE- 250/636: Performed by: NURSE ANESTHETIST, CERTIFIED REGISTERED

## 2021-05-11 PROCEDURE — 77030003666 HC NDL SPINAL BD -A: Performed by: SURGERY

## 2021-05-11 PROCEDURE — 49652 PR LAP, VENTRAL HERNIA REPAIR,REDUCIBLE: CPT | Performed by: SURGERY

## 2021-05-11 PROCEDURE — 77030025927 HC STPLR FIX SECURSTRP J&J -F: Performed by: SURGERY

## 2021-05-11 PROCEDURE — C1781 MESH (IMPLANTABLE): HCPCS | Performed by: SURGERY

## 2021-05-11 PROCEDURE — 77030002966 HC SUT PDS J&J -A: Performed by: SURGERY

## 2021-05-11 PROCEDURE — 74011000258 HC RX REV CODE- 258: Performed by: NURSE ANESTHETIST, CERTIFIED REGISTERED

## 2021-05-11 PROCEDURE — 76210000020 HC REC RM PH II FIRST 0.5 HR: Performed by: SURGERY

## 2021-05-11 PROCEDURE — 74011250637 HC RX REV CODE- 250/637: Performed by: NURSE ANESTHETIST, CERTIFIED REGISTERED

## 2021-05-11 PROCEDURE — 77030010507 HC ADH SKN DERMBND J&J -B: Performed by: SURGERY

## 2021-05-11 PROCEDURE — 74011000250 HC RX REV CODE- 250: Performed by: ANESTHESIOLOGY

## 2021-05-11 PROCEDURE — 74011250636 HC RX REV CODE- 250/636: Performed by: ANESTHESIOLOGY

## 2021-05-11 PROCEDURE — 74011000250 HC RX REV CODE- 250: Performed by: NURSE ANESTHETIST, CERTIFIED REGISTERED

## 2021-05-11 PROCEDURE — 76010000149 HC OR TIME 1 TO 1.5 HR: Performed by: SURGERY

## 2021-05-11 DEVICE — VENTRALIGHT ST MESH WITH ECHO 2 POSITIONING SYSTEM, 4.5" (11 CM), CIRCLE
Type: IMPLANTABLE DEVICE | Site: UMBILICAL | Status: FUNCTIONAL
Brand: VENTRALIGHT

## 2021-05-11 RX ORDER — ONDANSETRON 2 MG/ML
4 INJECTION INTRAMUSCULAR; INTRAVENOUS AS NEEDED
Status: DISCONTINUED | OUTPATIENT
Start: 2021-05-11 | End: 2021-05-11 | Stop reason: HOSPADM

## 2021-05-11 RX ORDER — FENTANYL CITRATE 50 UG/ML
25 INJECTION, SOLUTION INTRAMUSCULAR; INTRAVENOUS
Status: DISCONTINUED | OUTPATIENT
Start: 2021-05-11 | End: 2021-05-11 | Stop reason: HOSPADM

## 2021-05-11 RX ORDER — PROPOFOL 10 MG/ML
INJECTION, EMULSION INTRAVENOUS AS NEEDED
Status: DISCONTINUED | OUTPATIENT
Start: 2021-05-11 | End: 2021-05-11 | Stop reason: HOSPADM

## 2021-05-11 RX ORDER — SODIUM CHLORIDE 0.9 % (FLUSH) 0.9 %
5-40 SYRINGE (ML) INJECTION EVERY 8 HOURS
Status: DISCONTINUED | OUTPATIENT
Start: 2021-05-11 | End: 2021-05-11 | Stop reason: HOSPADM

## 2021-05-11 RX ORDER — SODIUM CHLORIDE 9 MG/ML
25 INJECTION, SOLUTION INTRAVENOUS CONTINUOUS
Status: DISCONTINUED | OUTPATIENT
Start: 2021-05-11 | End: 2021-05-11 | Stop reason: HOSPADM

## 2021-05-11 RX ORDER — KETAMINE HYDROCHLORIDE 10 MG/ML
INJECTION, SOLUTION INTRAMUSCULAR; INTRAVENOUS AS NEEDED
Status: DISCONTINUED | OUTPATIENT
Start: 2021-05-11 | End: 2021-05-11 | Stop reason: HOSPADM

## 2021-05-11 RX ORDER — HYDROMORPHONE HYDROCHLORIDE 1 MG/ML
0.2 INJECTION, SOLUTION INTRAMUSCULAR; INTRAVENOUS; SUBCUTANEOUS
Status: ACTIVE | OUTPATIENT
Start: 2021-05-11 | End: 2021-05-11

## 2021-05-11 RX ORDER — ALBUTEROL SULFATE 90 UG/1
AEROSOL, METERED RESPIRATORY (INHALATION) AS NEEDED
Status: DISCONTINUED | OUTPATIENT
Start: 2021-05-11 | End: 2021-05-11 | Stop reason: HOSPADM

## 2021-05-11 RX ORDER — OXYCODONE AND ACETAMINOPHEN 5; 325 MG/1; MG/1
1 TABLET ORAL
Qty: 18 TAB | Refills: 0 | Status: SHIPPED | OUTPATIENT
Start: 2021-05-11 | End: 2021-05-14

## 2021-05-11 RX ORDER — KETOROLAC TROMETHAMINE 30 MG/ML
INJECTION, SOLUTION INTRAMUSCULAR; INTRAVENOUS AS NEEDED
Status: DISCONTINUED | OUTPATIENT
Start: 2021-05-11 | End: 2021-05-11 | Stop reason: HOSPADM

## 2021-05-11 RX ORDER — FENTANYL CITRATE 50 UG/ML
INJECTION, SOLUTION INTRAMUSCULAR; INTRAVENOUS AS NEEDED
Status: DISCONTINUED | OUTPATIENT
Start: 2021-05-11 | End: 2021-05-11 | Stop reason: HOSPADM

## 2021-05-11 RX ORDER — OXYCODONE HYDROCHLORIDE 5 MG/1
5 TABLET ORAL AS NEEDED
Status: DISCONTINUED | OUTPATIENT
Start: 2021-05-11 | End: 2021-05-11 | Stop reason: HOSPADM

## 2021-05-11 RX ORDER — ROPIVACAINE HYDROCHLORIDE 5 MG/ML
150 INJECTION, SOLUTION EPIDURAL; INFILTRATION; PERINEURAL AS NEEDED
Status: DISCONTINUED | OUTPATIENT
Start: 2021-05-11 | End: 2021-05-11 | Stop reason: HOSPADM

## 2021-05-11 RX ORDER — POLYETHYLENE GLYCOL 3350 17 G/17G
17 POWDER, FOR SOLUTION ORAL DAILY
Qty: 14 PACKET | Refills: 1 | Status: SHIPPED | OUTPATIENT
Start: 2021-05-11

## 2021-05-11 RX ORDER — DIPHENHYDRAMINE HYDROCHLORIDE 50 MG/ML
12.5 INJECTION, SOLUTION INTRAMUSCULAR; INTRAVENOUS AS NEEDED
Status: DISCONTINUED | OUTPATIENT
Start: 2021-05-11 | End: 2021-05-11 | Stop reason: HOSPADM

## 2021-05-11 RX ORDER — FENTANYL CITRATE 50 UG/ML
50 INJECTION, SOLUTION INTRAMUSCULAR; INTRAVENOUS AS NEEDED
Status: DISCONTINUED | OUTPATIENT
Start: 2021-05-11 | End: 2021-05-11 | Stop reason: HOSPADM

## 2021-05-11 RX ORDER — LIDOCAINE HYDROCHLORIDE 10 MG/ML
0.1 INJECTION, SOLUTION EPIDURAL; INFILTRATION; INTRACAUDAL; PERINEURAL AS NEEDED
Status: DISCONTINUED | OUTPATIENT
Start: 2021-05-11 | End: 2021-05-11 | Stop reason: HOSPADM

## 2021-05-11 RX ORDER — MIDAZOLAM HYDROCHLORIDE 1 MG/ML
0.5 INJECTION, SOLUTION INTRAMUSCULAR; INTRAVENOUS
Status: DISCONTINUED | OUTPATIENT
Start: 2021-05-11 | End: 2021-05-11 | Stop reason: HOSPADM

## 2021-05-11 RX ORDER — ACETAMINOPHEN 325 MG/1
650 TABLET ORAL ONCE
Status: COMPLETED | OUTPATIENT
Start: 2021-05-11 | End: 2021-05-11

## 2021-05-11 RX ORDER — DEXAMETHASONE SODIUM PHOSPHATE 4 MG/ML
INJECTION, SOLUTION INTRA-ARTICULAR; INTRALESIONAL; INTRAMUSCULAR; INTRAVENOUS; SOFT TISSUE AS NEEDED
Status: DISCONTINUED | OUTPATIENT
Start: 2021-05-11 | End: 2021-05-11 | Stop reason: HOSPADM

## 2021-05-11 RX ORDER — BUPIVACAINE HYDROCHLORIDE AND EPINEPHRINE 5; 5 MG/ML; UG/ML
30 INJECTION, SOLUTION EPIDURAL; INTRACAUDAL; PERINEURAL ONCE
Status: COMPLETED | OUTPATIENT
Start: 2021-05-11 | End: 2021-05-11

## 2021-05-11 RX ORDER — LIDOCAINE HYDROCHLORIDE 20 MG/ML
INJECTION, SOLUTION EPIDURAL; INFILTRATION; INTRACAUDAL; PERINEURAL AS NEEDED
Status: DISCONTINUED | OUTPATIENT
Start: 2021-05-11 | End: 2021-05-11 | Stop reason: HOSPADM

## 2021-05-11 RX ORDER — MIDAZOLAM HYDROCHLORIDE 1 MG/ML
1 INJECTION, SOLUTION INTRAMUSCULAR; INTRAVENOUS AS NEEDED
Status: DISCONTINUED | OUTPATIENT
Start: 2021-05-11 | End: 2021-05-11 | Stop reason: HOSPADM

## 2021-05-11 RX ORDER — GLYCOPYRROLATE 0.2 MG/ML
INJECTION INTRAMUSCULAR; INTRAVENOUS AS NEEDED
Status: DISCONTINUED | OUTPATIENT
Start: 2021-05-11 | End: 2021-05-11 | Stop reason: HOSPADM

## 2021-05-11 RX ORDER — ROCURONIUM BROMIDE 10 MG/ML
INJECTION, SOLUTION INTRAVENOUS AS NEEDED
Status: DISCONTINUED | OUTPATIENT
Start: 2021-05-11 | End: 2021-05-11 | Stop reason: HOSPADM

## 2021-05-11 RX ORDER — ONDANSETRON 2 MG/ML
INJECTION INTRAMUSCULAR; INTRAVENOUS AS NEEDED
Status: DISCONTINUED | OUTPATIENT
Start: 2021-05-11 | End: 2021-05-11 | Stop reason: HOSPADM

## 2021-05-11 RX ORDER — NEOSTIGMINE METHYLSULFATE 1 MG/ML
INJECTION INTRAVENOUS AS NEEDED
Status: DISCONTINUED | OUTPATIENT
Start: 2021-05-11 | End: 2021-05-11 | Stop reason: HOSPADM

## 2021-05-11 RX ORDER — SODIUM CHLORIDE, SODIUM LACTATE, POTASSIUM CHLORIDE, CALCIUM CHLORIDE 600; 310; 30; 20 MG/100ML; MG/100ML; MG/100ML; MG/100ML
100 INJECTION, SOLUTION INTRAVENOUS CONTINUOUS
Status: DISCONTINUED | OUTPATIENT
Start: 2021-05-11 | End: 2021-05-11 | Stop reason: HOSPADM

## 2021-05-11 RX ORDER — MORPHINE SULFATE 2 MG/ML
2 INJECTION, SOLUTION INTRAMUSCULAR; INTRAVENOUS
Status: DISCONTINUED | OUTPATIENT
Start: 2021-05-11 | End: 2021-05-11 | Stop reason: HOSPADM

## 2021-05-11 RX ORDER — SODIUM CHLORIDE, SODIUM LACTATE, POTASSIUM CHLORIDE, CALCIUM CHLORIDE 600; 310; 30; 20 MG/100ML; MG/100ML; MG/100ML; MG/100ML
INJECTION, SOLUTION INTRAVENOUS
Status: DISCONTINUED | OUTPATIENT
Start: 2021-05-11 | End: 2021-05-11 | Stop reason: HOSPADM

## 2021-05-11 RX ORDER — SODIUM CHLORIDE 0.9 % (FLUSH) 0.9 %
5-40 SYRINGE (ML) INJECTION AS NEEDED
Status: DISCONTINUED | OUTPATIENT
Start: 2021-05-11 | End: 2021-05-11 | Stop reason: HOSPADM

## 2021-05-11 RX ORDER — ASPIRIN 81 MG/1
TABLET ORAL DAILY
COMMUNITY

## 2021-05-11 RX ORDER — MIDAZOLAM HYDROCHLORIDE 1 MG/ML
INJECTION, SOLUTION INTRAMUSCULAR; INTRAVENOUS AS NEEDED
Status: DISCONTINUED | OUTPATIENT
Start: 2021-05-11 | End: 2021-05-11 | Stop reason: HOSPADM

## 2021-05-11 RX ORDER — SUCCINYLCHOLINE CHLORIDE 20 MG/ML
INJECTION INTRAMUSCULAR; INTRAVENOUS AS NEEDED
Status: DISCONTINUED | OUTPATIENT
Start: 2021-05-11 | End: 2021-05-11 | Stop reason: HOSPADM

## 2021-05-11 RX ORDER — SODIUM CHLORIDE, SODIUM LACTATE, POTASSIUM CHLORIDE, CALCIUM CHLORIDE 600; 310; 30; 20 MG/100ML; MG/100ML; MG/100ML; MG/100ML
1000 INJECTION, SOLUTION INTRAVENOUS CONTINUOUS
Status: DISCONTINUED | OUTPATIENT
Start: 2021-05-11 | End: 2021-05-11 | Stop reason: HOSPADM

## 2021-05-11 RX ORDER — EPHEDRINE SULFATE/0.9% NACL/PF 50 MG/5 ML
5 SYRINGE (ML) INTRAVENOUS AS NEEDED
Status: DISCONTINUED | OUTPATIENT
Start: 2021-05-11 | End: 2021-05-11 | Stop reason: HOSPADM

## 2021-05-11 RX ADMIN — CEFAZOLIN SODIUM 3 G: 10 INJECTION, POWDER, FOR SOLUTION INTRAVENOUS at 14:07

## 2021-05-11 RX ADMIN — FENTANYL CITRATE 50 MCG: 50 INJECTION, SOLUTION INTRAMUSCULAR; INTRAVENOUS at 12:59

## 2021-05-11 RX ADMIN — SODIUM CHLORIDE, POTASSIUM CHLORIDE, SODIUM LACTATE AND CALCIUM CHLORIDE: 600; 310; 30; 20 INJECTION, SOLUTION INTRAVENOUS at 13:00

## 2021-05-11 RX ADMIN — ALBUTEROL SULFATE 2 PUFF: 90 AEROSOL, METERED RESPIRATORY (INHALATION) at 13:52

## 2021-05-11 RX ADMIN — FENTANYL CITRATE 50 MCG: 50 INJECTION, SOLUTION INTRAMUSCULAR; INTRAVENOUS at 13:21

## 2021-05-11 RX ADMIN — ROCURONIUM BROMIDE 5 MG: 10 SOLUTION INTRAVENOUS at 13:05

## 2021-05-11 RX ADMIN — PROPOFOL 300 MG: 10 INJECTION, EMULSION INTRAVENOUS at 13:05

## 2021-05-11 RX ADMIN — ONDANSETRON HYDROCHLORIDE 4 MG: 2 INJECTION, SOLUTION INTRAMUSCULAR; INTRAVENOUS at 13:25

## 2021-05-11 RX ADMIN — KETAMINE HYDROCHLORIDE 30 MG: 10 INJECTION, SOLUTION INTRAMUSCULAR; INTRAVENOUS at 13:13

## 2021-05-11 RX ADMIN — SUGAMMADEX 200 MG: 100 INJECTION, SOLUTION INTRAVENOUS at 14:01

## 2021-05-11 RX ADMIN — ALBUTEROL SULFATE 2 PUFF: 90 AEROSOL, METERED RESPIRATORY (INHALATION) at 13:55

## 2021-05-11 RX ADMIN — LIDOCAINE HYDROCHLORIDE 0.1 ML: 10 INJECTION, SOLUTION EPIDURAL; INFILTRATION; INTRACAUDAL; PERINEURAL at 12:35

## 2021-05-11 RX ADMIN — ACETAMINOPHEN 650 MG: 325 TABLET ORAL at 12:26

## 2021-05-11 RX ADMIN — KETOROLAC TROMETHAMINE 30 MG: 30 INJECTION, SOLUTION INTRAMUSCULAR; INTRAVENOUS at 13:40

## 2021-05-11 RX ADMIN — DEXAMETHASONE SODIUM PHOSPHATE 4 MG: 4 INJECTION, SOLUTION INTRAMUSCULAR; INTRAVENOUS at 13:15

## 2021-05-11 RX ADMIN — SODIUM CHLORIDE, POTASSIUM CHLORIDE, SODIUM LACTATE AND CALCIUM CHLORIDE 1000 ML: 600; 310; 30; 20 INJECTION, SOLUTION INTRAVENOUS at 12:35

## 2021-05-11 RX ADMIN — NEOSTIGMINE METHYLSULFATE 4 MG: 1 INJECTION, SOLUTION INTRAVENOUS at 13:47

## 2021-05-11 RX ADMIN — GLYCOPYRROLATE 0.6 MG: 0.2 INJECTION, SOLUTION INTRAMUSCULAR; INTRAVENOUS at 13:47

## 2021-05-11 RX ADMIN — MIDAZOLAM 2 MG: 1 INJECTION INTRAMUSCULAR; INTRAVENOUS at 12:59

## 2021-05-11 RX ADMIN — ALBUTEROL SULFATE 2 PUFF: 90 AEROSOL, METERED RESPIRATORY (INHALATION) at 13:49

## 2021-05-11 RX ADMIN — FENTANYL CITRATE 25 MCG: 50 INJECTION, SOLUTION INTRAMUSCULAR; INTRAVENOUS at 14:50

## 2021-05-11 RX ADMIN — LIDOCAINE HYDROCHLORIDE 70 MG: 20 INJECTION, SOLUTION EPIDURAL; INFILTRATION; INTRACAUDAL; PERINEURAL at 13:05

## 2021-05-11 RX ADMIN — ONDANSETRON 4 MG: 2 INJECTION INTRAMUSCULAR; INTRAVENOUS at 14:49

## 2021-05-11 RX ADMIN — OXYCODONE HYDROCHLORIDE 5 MG: 5 TABLET ORAL at 15:15

## 2021-05-11 RX ADMIN — ROCURONIUM BROMIDE 30 MG: 10 SOLUTION INTRAVENOUS at 13:11

## 2021-05-11 RX ADMIN — SUCCINYLCHOLINE CHLORIDE 200 MG: 20 INJECTION, SOLUTION INTRAMUSCULAR; INTRAVENOUS at 13:05

## 2021-05-11 NOTE — DISCHARGE INSTRUCTIONS
Discharge Instructions for General Surgery Patients       1. Do not lift any objects weighing more than 15 pounds for 2 weeks. 2. Do not do any housework including vacuuming, scrubbing or yardwork for 4 weeks. 3. Do not drive or operate machinery while taking sedating or narcotic medications. 4. Post operative pain is expected. Try to wean off narcotics as soon as able and take tylenol or NSAIDS. Do not take tylenol with Norco or Percocet as this may harm your liver. No NSAIDS if you are bariatric surgery patient. 5. You may walk as desired and go up and down stairs as needed. Walking is encouraged. 6. You may shower the day after surgery. Do not take tub baths, swim or use hot tubs for 2 weeks. Pat dry wounds after with a towel. 7. Leave glue on wounds. It will fall off with time. Do not scrub around incisions. If redness develops around the glue okay to peel off in hot shower. 8. Regular diet. Take Miralax once or twice a day as needed for constipation. 9. Follow up with provider as scheduled. 10. If you experience fever (greater than 101.5), chills, vomiting or redness or drainage at surgical site, please contact your surgeons office. If you have further questions or concerns, please call your surgeons office at [phone].          ______________________________________________________________________    Anesthesia Discharge Instructions    After general anesthesia or intervenous sedation, for 24 hours or while taking prescription Narcotics:  · Limit your activities  · Do not drive or operate hazardous machinery  · If you have not urinated within 8 hours after discharge, please contact your surgeon on call.   · Do not make important personal or business decisions  · Do not drink alcoholic beverages    Report the following to your surgeon:  · Excessive pain, swelling, redness or odor of or around the surgical area  · Temperature over 100.5 degrees  · Nausea and vomiting lasting longer than 4 hours or if unable to take medication  · Any signs of decreased circulation or nerve impairment to extremity:  Change in color, persistent numbness, tingling, coldness or increased pain.   · Any questions

## 2021-05-11 NOTE — INTERVAL H&P NOTE
Update History & Physical    The Patient's History and Physical of 4/21/21 was reviewed with the patient and I examined the patient. There was no change. The surgical site was confirmed by the patient and me. Plan:  The risk, benefits, expected outcome, and alternative to the recommended procedure have been discussed with the patient. Patient understands and wants to proceed with the procedure.     Electronically signed by Alirio Hernandez MD on 5/11/2021 at 12:15 PM

## 2021-05-11 NOTE — PROGRESS NOTES
1505: discharge instructions reviewed via phone with pt wife Deb Mansfield, opportunity for questions provided.

## 2021-05-11 NOTE — ANESTHESIA POSTPROCEDURE EVALUATION
Post-Anesthesia Evaluation and Assessment    Patient: Rebecca Mueller MRN: [id number]  SSN: [id number]    YOB: 1980  Age: 41 y.o. Sex: male      I have evaluated the patient and they are stable and ready for discharge from the PACU. Cardiovascular Function/Vital Signs  Visit Vitals  /75   Pulse 80   Temp 36.4 °C (97.6 °F)   Resp 17   Ht 5' 9\" (1.753 m)   Wt 127 kg (280 lb)   SpO2 93%   BMI 41.35 kg/m²       Patient is status post General anesthesia for Procedure(s):  LAPAROSCOPIC UMBILICAL HERNIA REPAIR. Nausea/Vomiting: None    Postoperative hydration reviewed and adequate. Pain:  Pain Scale 1: Numeric (0 - 10) (05/11/21 1420)  Pain Intensity 1: 0 (05/11/21 1420)   Managed    Neurological Status:   Neuro (WDL): Exceptions to WDL (05/11/21 1420)  Neuro  Neurologic State: Drowsy; Eyes open to voice (05/11/21 1420)  Orientation Level: Oriented X4 (05/11/21 1420)  Cognition: Follows commands (05/11/21 1420)  Speech: Clear (05/11/21 1420)  LUE Motor Response: Purposeful (05/11/21 1420)  LLE Motor Response: Purposeful (05/11/21 1420)  RUE Motor Response: Purposeful (05/11/21 1420)  RLE Motor Response: Purposeful (05/11/21 1420)   At baseline    Mental Status, Level of Consciousness: Alert and  oriented to person, place, and time    Pulmonary Status:   O2 Device: None (Room air) (05/11/21 1420)   Adequate oxygenation and airway patent    Complications related to anesthesia: None    Post-anesthesia assessment completed. No concerns    Signed By: Rajani West MD     May 11, 2021              Procedure(s):  LAPAROSCOPIC UMBILICAL HERNIA REPAIR. general    <BSHSIANPOST>    INITIAL Post-op Vital signs:   Vitals Value Taken Time   /75 05/11/21 1420   Temp 36.4 °C (97.6 °F) 05/11/21 1416   Pulse 68 05/11/21 1425   Resp 15 05/11/21 1425   SpO2 97 % 05/11/21 1425   Vitals shown include unvalidated device data.

## 2021-05-11 NOTE — OP NOTES
OPERATIVE NOTE    Date of Procedure: 5/11/2021     Preoperative Diagnosis: UMBILICAL HERNIA  Postoperative Diagnosis: UMBILICAL HERNIA      Procedure: Procedure(s):  LAPAROSCOPIC UMBILICAL HERNIA REPAIR with mesh    Surgeon: Noman Davalos MD  Assistant(s): Harris Langston - They were critically important in the exposure and key portions of the case  Anesthesia: General   Indications: 40 y/o M with small symptomatic umbilical hernia  Findings: 1 cm umbilical hernia with pre-peritoneal fat incarceration    Description of Operation: Abi Benito was identified in the pre-operative holding area. Informed consent was obtained after a complete discussion of risks, benefits and alternatives to surgery were had with the patient. The patient was brought back to the operating room and placed under general endotracheal anesthesia in the supine position on the operating room table. The patient was then prepped and draped in the usual sterile fashion. A timeout was performed. We then injected local anesthetic in the left subcostal midclavicular space. We inserted a 5 mm trocar using the Optiview technique. We safely entered the abdomen and achieved pneumoperitoneum. We then placed a 12 mm trocar caudad to this followed by another 5 mm trocar in the LLQ. We identified the hernia. Hernia was about 1 cm of fascial opening right underneath the umbilicus. There was only incarcerated preperitoneal fat and no incarceration of omentum or bowel. Only preperitoneal fat filled the hernia and there was no hernia sac within the hernia. We created a small preperitoneal flap around the edges of the hernia to expose raw fascia edges. We then used the trans-fascial suture passer and 0 PDS suture to close the fascial defect. We chose the appropriate sized mesh for at least a 4 cm overhang beyond the original fascial edge of the hernia defect.  The chosen mesh size was 11 cm and brand was Ventralight ST on the Echo 2 positioning device. The mesh was dipped in saline and passed via the 12 mm trocar into the abdomen. We centered the mesh and delivered the orientation stitch through the umbilicus. We then used the Secure-strap tacker to affix the mesh to the abdominal wall in a \"double crown\" fashion with 1 cm spacing. The mesh laid well with good hemostasis. We closed the 12 mm trocar with a 0 PDS suture and removed the trocars under direct visualization while evacuating pneumoperitoneum. The dermis was closed with 4-0 Monocryl followed by Dermabond for the skin. At the end of the procedure all instrument, needle, and sponge counts were correct. I was present and scrubbed throughout the entirety of the case. The patient awoke from anesthesia and was extubated without complication and sent to PACU in stable condition. Estimated Blood Loss: Minimal    Specimens: * No specimens in log *     Complications: none    Implants:   Implant Name Type Inv. Item Serial No.  Lot No. LRB No. Used Action   MESH SURG DIA4. 5IN CIR W/ ECHO 2 POS SYS VENTRALIGHT - SNA Mesh MESH SURG DIA4. 5IN CIR W/ ECHO 2 POS SYS VENTRALIGHT NA BARD DAVOL_WD [id number] N/A 1 Implanted         Jermaine Echavarria MD  Bariatric and General Surgeon  [facility]  5/11/2021

## 2021-05-11 NOTE — ADDENDUM NOTE
Addendum  created 05/11/21 1433 by Kimberley Jerry CRNA    Intraprocedure Meds edited, Orders acknowledged in Narrator

## 2021-05-11 NOTE — ANESTHESIA PREPROCEDURE EVALUATION
Relevant Problems   NEUROLOGY   (+) TIA (transient ischemic attack)      ENDOCRINE   (+) Obesity, morbid (HCC)       Anesthetic History   No history of anesthetic complications            Review of Systems / Medical History  Patient summary reviewed, nursing notes reviewed and pertinent labs reviewed    Pulmonary          Smoker         Neuro/Psych         TIA     Cardiovascular  Within defined limits                     GI/Hepatic/Renal  Within defined limits              Endo/Other        Morbid obesity     Other Findings            Physical Exam    Airway  Mallampati: II  TM Distance: > 6 cm  Neck ROM: normal range of motion   Mouth opening: Normal     Cardiovascular  Regular rate and rhythm,  S1 and S2 normal,  no murmur, click, rub, or gallop             Dental  No notable dental hx       Pulmonary  Breath sounds clear to auscultation               Abdominal  GI exam deferred       Other Findings            Anesthetic Plan    ASA: 3  Anesthesia type: general          Induction: Intravenous  Anesthetic plan and risks discussed with: Patient

## 2021-05-11 NOTE — PERIOP NOTES
Patient: Rebecca Mueller MRN: [id number]  SSN: [id number]   YOB: 1980  Age: 41 y.o. Sex: male     Patient is status post Procedure(s):  LAPAROSCOPIC UMBILICAL HERNIA REPAIR.     Surgeon(s) and Role:     * Anuradha Price MD - Primary    Local/Dose/Irrigation:  0.5% BUPIVACAINE W EPI                  Peripheral IV 05/11/21 Left Hand (Active)            Airway - Endotracheal Tube 05/11/21 Oral (Active)                   Dressing/Packing:  Incision 05/11/21 Abdomen-Dressing/Treatment: Surgical glue (05/11/21 1300)    Splint/Cast:  ]    Other:

## 2021-05-26 ENCOUNTER — VIRTUAL VISIT (OUTPATIENT)
Dept: SURGERY | Age: 41
End: 2021-05-26
Payer: COMMERCIAL

## 2021-05-26 DIAGNOSIS — Z09 POSTOPERATIVE EXAMINATION: Primary | ICD-10-CM

## 2021-05-26 PROCEDURE — 99024 POSTOP FOLLOW-UP VISIT: CPT | Performed by: SURGERY

## 2021-05-26 NOTE — PROGRESS NOTES
Surgery Progress Note    5/26/2021    CC: Postoperative pain    Subjective:     Patient is 2 weeks out from laparoscopic umbilical hernia repair with mesh. Expected postoperative pain but is improving nicely. Ambulating. Tolerating a diet. No issues. Consent:  The patient and/or their healthcare decision maker is aware that this patient-initiated Telehealth encounter is a billable service, with coverage as determined by the patient's insurance carrier. They are aware that they may receive a bill and has provided verbal consent to proceed: Yes     This virtual visit was conducted via [employer]. Pursuant to the emergency declaration under the [facility] waiver authority and the [employer] Act, this Virtual  Visit was conducted to reduce the patient's risk of exposure to COVID-19 and provide continuity of care for an established patient. Services were provided through a video synchronous discussion virtually to substitute for in-person clinic visit. Due to this being a TeleHealth evaluation, many elements of the physical examination are unable to be assessed. Constitutional: No fever or chills  Neurologic: No headache  Eyes: No scleral icterus or irritated eyes  Nose: No nasal pain or drainage  Mouth: No oral lesions or sore throat  Cardiac: No palpations or chest pain  Pulmonary: No cough or shortness or breath  Gastrointestinal: abd soreness, No nausea, emesis, diarrhea, or constipation  Genitourinary: No dysuria  Musculoskeletal: No muscle or joint tenderness  Skin: No rashes or lesions  Psychiatric: No anxiety or depressed mood    Objective: There were no vitals taken for this visit.     General: No acute distress, conversant  Pulmonary: Symmetric chest rise with normal effort  GI: Wounds look CDI  Psych: Appropriate mood and affect    Assessment:     75-year-old male status post umbilical hernia repair doing well    Plan:     Okay to resume work on Tuesday. Commend no more than 20 pounds or so for lifting until 1 month postoperatively and then can resume full activity. Sutures will dissolve in the next 2 to 3 months including the tacks  Follow-up as needed    Total time involved with this patient's care was: 15 minutes. This involved reviewing patient record, talking with patient, and charting on patient.     Jazmín Rhodes MD  Bariatric and General Surgeon  [facility]

## 2021-05-26 NOTE — PROGRESS NOTES
1. Have you been to the ER, urgent care clinic since your last visit? Hospitalized since your last visit? no    2. Have you seen or consulted any other health care providers outside of the [address] [place] [address] since your last visit? Include any pap smears or colon screening.  no

## 2021-05-27 ENCOUNTER — TELEPHONE (OUTPATIENT)
Dept: SURGERY | Age: 41
End: 2021-05-27

## 2021-05-27 NOTE — TELEPHONE ENCOUNTER
Left message for patient regarding [facility] [place] paperwork has been completed and faxed back with confirmation.

## 2021-06-10 ENCOUNTER — TELEPHONE (OUTPATIENT)
Dept: SURGERY | Age: 41
End: 2021-06-10

## 2021-06-10 NOTE — TELEPHONE ENCOUNTER
Spoke to patient and informed him letter is in [place]. Patient was able to view letter in [place].

## 2021-06-10 NOTE — TELEPHONE ENCOUNTER
Patient called again stating his HR for his job has called him 3 times since this morning pushing his for the fitness for duty *(with restrictions) letter. Patient is at risk of job loss without this letter. Needs it today.

## 2021-06-10 NOTE — TELEPHONE ENCOUNTER
Please call pt back. Pt needs a return to work letter today. Pt is currently at work and his job has not gotten his letter releasing him to work.     Pt would like letter sent through [address]

## 2022-03-18 PROBLEM — K42.9 UMBILICAL HERNIA WITHOUT OBSTRUCTION AND WITHOUT GANGRENE: Status: ACTIVE | Noted: 2021-04-21

## 2022-03-19 PROBLEM — G45.9 TIA (TRANSIENT ISCHEMIC ATTACK): Status: ACTIVE | Noted: 2018-02-12

## 2022-03-20 PROBLEM — E66.01 OBESITY, MORBID (HCC): Status: ACTIVE | Noted: 2018-03-01

## 2023-05-10 RX ORDER — ASPIRIN 81 MG/1
TABLET ORAL DAILY
COMMUNITY

## 2023-05-10 RX ORDER — POLYETHYLENE GLYCOL 3350 17 G/17G
POWDER, FOR SOLUTION ORAL DAILY
COMMUNITY
Start: 2021-05-11

## 2023-05-10 RX ORDER — LISINOPRIL 5 MG/1
1 TABLET ORAL DAILY
COMMUNITY
Start: 2021-04-16

## 2024-07-03 ENCOUNTER — OFFICE VISIT (OUTPATIENT)
Age: 44
End: 2024-07-03

## 2024-07-03 VITALS
SYSTOLIC BLOOD PRESSURE: 130 MMHG | OXYGEN SATURATION: 96 % | WEIGHT: 293 LBS | TEMPERATURE: 98.1 F | DIASTOLIC BLOOD PRESSURE: 88 MMHG | BODY MASS INDEX: 43.27 KG/M2 | HEART RATE: 69 BPM

## 2024-07-03 DIAGNOSIS — E78.5 HYPERLIPIDEMIA, UNSPECIFIED HYPERLIPIDEMIA TYPE: ICD-10-CM

## 2024-07-03 DIAGNOSIS — I87.2 STASIS DERMATITIS: ICD-10-CM

## 2024-07-03 DIAGNOSIS — R60.9 EDEMA, UNSPECIFIED TYPE: Primary | ICD-10-CM

## 2024-07-03 LAB — GLUCOSE DOSE-GTT, POC: 113

## 2024-07-03 RX ORDER — TRIAMCINOLONE ACETONIDE 1 MG/G
CREAM TOPICAL
Qty: 80 G | Refills: 0 | Status: SHIPPED | OUTPATIENT
Start: 2024-07-03

## 2024-07-03 NOTE — PATIENT INSTRUCTIONS
Patient was seen today for swelling of his lower extremities  Low risk for blood clots, I do not suspect DVT  He is not having any emergent signs and symptoms, no chest pain, shortness of breath, palpitations, headaches or dizziness  Blood sugar today is: 113 fasting- placing him at prediabetic levels  Blood pressure is 130/88  I would like for you to get in with a primary care provider as soon as possible for blood work and further evaluation  I have referred you to a primary care provider in the area, I would like for you to give them a call  If they are unable to get you in in the next month or 2, I do recommend following up with a center that is able to do some basic blood work, like [facility]  Please monitor your symptoms carefully, go to the emergency department if you develop any shortness of breath, chest pain, palpitations, calf/lower leg pain or persistent swelling  I have prescribed triamcinolone cream for your lower legs which may help with the rash and the discomfort

## 2024-07-03 NOTE — PROGRESS NOTES
Garth Wetzel (:  1980) is a 43 y.o. male,New patient, here for evaluation of the following chief complaint(s):  Edema (Sx x2 months; Bilateral ankle/lower leg edema, Bilateral hand edema.)      Assessment & Plan :  Visit Diagnoses and Associated Orders       Edema, unspecified type    -  Primary    AMB POC GLUCOSE TEST [62716 CPT(R)]      [facility] [REF24 Custom]           Stasis dermatitis        triamcinolone (KENALOG) 0.1 % cream [8113]           Hyperlipidemia, unspecified hyperlipidemia type        [facility] [REF24 Custom]           ORDERS WITHOUT AN ASSOCIATED DIAGNOSIS    Omega-3 Fatty Acids (OMEGA-3 FISH OIL PO) [76404]      Multiple Vitamin (MULTIVITAMIN ADULT PO) [892960]            Patient was seen today for swelling of his lower extremities  Low risk for blood clots, I do not suspect DVT  He is not having any emergent signs and symptoms, no chest pain, shortness of breath, palpitations, headaches or dizziness  Blood sugar today is: 113 fasting- placing him at prediabetic levels  Blood pressure is 130/88  I would like for you to get in with a primary care provider as soon as possible for blood work and further evaluation  I have referred you to a primary care provider in the area, I would like for you to give them a call  If they are unable to get you in in the next month or 2, I do recommend following up with a center that is able to do some basic blood work, like [facility]  Please monitor your symptoms carefully, go to the emergency department if you develop any shortness of breath, chest pain, palpitations, calf/lower leg pain or persistent swelling  I have prescribed triamcinolone cream for your lower legs which may help with the rash and the discomfort, I suspect stasis dermatitis       Subjective :  HPI     43 y.o. male presents with symptoms of lower extremity edema for the past 2 months.  He denies any calf or

## 2024-07-10 ENCOUNTER — APPOINTMENT (OUTPATIENT)
Facility: HOSPITAL | Age: 44
End: 2024-07-10
Payer: COMMERCIAL

## 2024-07-10 ENCOUNTER — HOSPITAL ENCOUNTER (EMERGENCY)
Facility: HOSPITAL | Age: 44
Discharge: HOME | End: 2024-07-10
Attending: STUDENT IN AN ORGANIZED HEALTH CARE EDUCATION/TRAINING PROGRAM
Payer: COMMERCIAL

## 2024-07-10 VITALS
WEIGHT: 294.53 LBS | OXYGEN SATURATION: 96 % | SYSTOLIC BLOOD PRESSURE: 154 MMHG | RESPIRATION RATE: 16 BRPM | HEIGHT: 70 IN | DIASTOLIC BLOOD PRESSURE: 110 MMHG | TEMPERATURE: 97.9 F | BODY MASS INDEX: 42.17 KG/M2 | HEART RATE: 85 BPM

## 2024-07-10 DIAGNOSIS — M79.661 RIGHT CALF PAIN: Primary | ICD-10-CM

## 2024-07-10 LAB
ALBUMIN SERPL-MCNC: 3.6 G/DL (ref 3.5–5)
ALBUMIN/GLOB SERPL: 1.3 (ref 1.1–2.2)
ALP SERPL-CCNC: 67 U/L (ref 45–117)
ALT SERPL-CCNC: 47 U/L (ref 12–78)
ANION GAP SERPL CALC-SCNC: 6 MMOL/L (ref 5–15)
AST SERPL-CCNC: 34 U/L (ref 15–37)
BASOPHILS # BLD: 0.1 K/UL (ref 0–0.1)
BASOPHILS NFR BLD: 1 % (ref 0–1)
BILIRUB SERPL-MCNC: 0.5 MG/DL (ref 0.2–1)
BUN SERPL-MCNC: 19 MG/DL (ref 6–20)
BUN/CREAT SERPL: 19 (ref 12–20)
CALCIUM SERPL-MCNC: 9 MG/DL (ref 8.5–10.1)
CHLORIDE SERPL-SCNC: 104 MMOL/L (ref 97–108)
CO2 SERPL-SCNC: 28 MMOL/L (ref 21–32)
CREAT SERPL-MCNC: 0.99 MG/DL (ref 0.7–1.3)
DIFFERENTIAL METHOD BLD: NORMAL
ECHO BSA: 2.57 M2
EOSINOPHIL # BLD: 0.4 K/UL (ref 0–0.4)
EOSINOPHIL NFR BLD: 4 % (ref 0–7)
ERYTHROCYTE [DISTWIDTH] IN BLOOD BY AUTOMATED COUNT: 13.5 % (ref 11.5–14.5)
GLOBULIN SER CALC-MCNC: 2.7 G/DL (ref 2–4)
GLUCOSE SERPL-MCNC: 121 MG/DL (ref 65–100)
HCT VFR BLD AUTO: 39.4 % (ref 36.6–50.3)
HGB BLD-MCNC: 13.3 G/DL (ref 12.1–17)
IMM GRANULOCYTES # BLD AUTO: 0 K/UL (ref 0–0.04)
IMM GRANULOCYTES NFR BLD AUTO: 0 % (ref 0–0.5)
LYMPHOCYTES # BLD: 2.2 K/UL (ref 0.8–3.5)
LYMPHOCYTES NFR BLD: 22 % (ref 12–49)
MCH RBC QN AUTO: 30 PG (ref 26–34)
MCHC RBC AUTO-ENTMCNC: 33.8 G/DL (ref 30–36.5)
MCV RBC AUTO: 88.7 FL (ref 80–99)
MONOCYTES # BLD: 0.8 K/UL (ref 0–1)
MONOCYTES NFR BLD: 8 % (ref 5–13)
NEUTS SEG # BLD: 6.4 K/UL (ref 1.8–8)
NEUTS SEG NFR BLD: 65 % (ref 32–75)
NRBC # BLD: 0 K/UL (ref 0–0.01)
NRBC BLD-RTO: 0 PER 100 WBC
PLATELET # BLD AUTO: 243 K/UL (ref 150–400)
PMV BLD AUTO: 10.2 FL (ref 8.9–12.9)
POTASSIUM SERPL-SCNC: 3.8 MMOL/L (ref 3.5–5.1)
PROT SERPL-MCNC: 6.3 G/DL (ref 6.4–8.2)
RBC # BLD AUTO: 4.44 M/UL (ref 4.1–5.7)
SODIUM SERPL-SCNC: 138 MMOL/L (ref 136–145)
WBC # BLD AUTO: 9.9 K/UL (ref 4.1–11.1)

## 2024-07-10 PROCEDURE — 93971 EXTREMITY STUDY: CPT

## 2024-07-10 PROCEDURE — 85025 COMPLETE CBC W/AUTO DIFF WBC: CPT

## 2024-07-10 PROCEDURE — 80053 COMPREHEN METABOLIC PANEL: CPT

## 2024-07-10 PROCEDURE — 99284 EMERGENCY DEPT VISIT MOD MDM: CPT

## 2024-07-10 PROCEDURE — 6370000000 HC RX 637 (ALT 250 FOR IP): Performed by: STUDENT IN AN ORGANIZED HEALTH CARE EDUCATION/TRAINING PROGRAM

## 2024-07-10 PROCEDURE — 36415 COLL VENOUS BLD VENIPUNCTURE: CPT

## 2024-07-10 RX ORDER — OXYCODONE HYDROCHLORIDE 5 MG/1
5 TABLET ORAL
Status: COMPLETED | OUTPATIENT
Start: 2024-07-10 | End: 2024-07-10

## 2024-07-10 RX ORDER — OXYCODONE HYDROCHLORIDE 5 MG/1
10 TABLET ORAL
Status: COMPLETED | OUTPATIENT
Start: 2024-07-10 | End: 2024-07-10

## 2024-07-10 RX ADMIN — OXYCODONE HYDROCHLORIDE 5 MG: 5 TABLET ORAL at 23:45

## 2024-07-10 RX ADMIN — OXYCODONE HYDROCHLORIDE 10 MG: 5 TABLET ORAL at 21:36

## 2024-07-10 ASSESSMENT — PAIN DESCRIPTION - ORIENTATION: ORIENTATION: RIGHT

## 2024-07-10 ASSESSMENT — PAIN DESCRIPTION - LOCATION: LOCATION: LEG

## 2024-07-10 ASSESSMENT — LIFESTYLE VARIABLES
HOW OFTEN DO YOU HAVE A DRINK CONTAINING ALCOHOL: NEVER
HOW MANY STANDARD DRINKS CONTAINING ALCOHOL DO YOU HAVE ON A TYPICAL DAY: PATIENT DOES NOT DRINK

## 2024-07-10 ASSESSMENT — PAIN SCALES - GENERAL: PAINLEVEL_OUTOF10: 8

## 2024-07-10 ASSESSMENT — PAIN - FUNCTIONAL ASSESSMENT: PAIN_FUNCTIONAL_ASSESSMENT: 0-10

## 2024-07-11 RX ORDER — OXYCODONE HYDROCHLORIDE 5 MG/1
5 TABLET ORAL EVERY 6 HOURS PRN
Qty: 12 TABLET | Refills: 0 | Status: SHIPPED | OUTPATIENT
Start: 2024-07-11 | End: 2024-07-14

## 2024-07-11 NOTE — ED PROVIDER NOTES
Result              PROCEDURES   Unless otherwise noted below, none  Procedures       CRITICAL CARE TIME       SCREENINGS   NIH Stroke Score     Heart Score     Curb-65        EMERGENCY DEPARTMENT COURSE, COUNSELING and DIFFERENTIAL DIAGNOSIS/MDM   Vitals:    Vitals:    07/10/24 1927   BP: (!) 154/110   Pulse: 85   Resp: 16   Temp: 97.9 °F (36.6 °C)   TempSrc: Oral   SpO2: 96%   Weight: 133.6 kg (294 lb 8.6 oz)   Height: 1.778 m (5' 10\")            Patient was given the following medications:  Medications   oxyCODONE (ROXICODONE) immediate release tablet 10 mg (10 mg Oral Given 7/10/24 2136)   oxyCODONE (ROXICODONE) immediate release tablet 5 mg (5 mg Oral Given 7/10/24 2345)       CONSULTS: (Who and What was discussed)  None      Chronic Conditions: denies    Social Determinants affecting Dx or Tx: None    Records Reviewed (source and summary of external notes): Nursing Notes, Previous Radiology Studies, and Previous Laboratory Studies    CC/HPI Summary, DDx, ED Course, and Reassessment: Patient presenting with chief complaint of progressively worsening right calf swelling and pain after injuring it on 4 July.  On exam he is hemodynamically stable, vital signs are stable, Achilles function is intact, he is able to dorsi flex and plantarflex his leg without difficulty.  His compartments are soft, no sensory changes, 2+ DP pulses.  Differentials include compartment syndrome, tear, DVT, hematoma, abscess.  Duplex showed no DVT but large fluid collection anterior to the calf.  Given pain medication with improvement.  Discussed with orthopedics, recommended continued weightbearing, range of motion.  Recommended follow-up and discussed return precautions for any worsening of symptoms, development of fever or chills to return to the ER.    Patient understands that this still may have an early presentation of an emergent medical condition that will require a recheck. I specifically discussed return precautions with the

## 2024-07-11 NOTE — ED NOTES
Patient discharged from the ED by EKATERINA Mathews. Diagnosis, medications, precautions and follow-ups were reviewed with the patient/family. Questions were asked and answered prior to departure. Patient departed the ED via walk-out and was accompanied by wife.

## 2024-07-11 NOTE — DISCHARGE INSTRUCTIONS
Please elevate your leg, do range of motion exercises of your calf. If you experience any new or concerning symptoms or your symptoms change or worsen, return to the ER as soon as possible.

## (undated) DEVICE — STAPLER INT DIA5MM 25 ABSRB STRP FIX DISP FOR HERN MESH

## (undated) DEVICE — SOL IRR SOD CL 0.9% 1000ML BTL --

## (undated) DEVICE — INFECTION CONTROL KIT SYS

## (undated) DEVICE — NEEDLE SPNL 22GA L3.5IN BLK HUB S STL REG WALL FIT STYL W/

## (undated) DEVICE — STERILE POLYISOPRENE POWDER-FREE SURGICAL GLOVES WITH EMOLLIENT COATING: Brand: PROTEXIS

## (undated) DEVICE — TROCAR ENDOSCP L100MM DIA12MM UNIV SL OBT RADLUC STBL SL

## (undated) DEVICE — PREP SKN CHLRAPRP APL 26ML STR --

## (undated) DEVICE — SYSTEM EVAC SMOKE LAPARSCOPIC

## (undated) DEVICE — SHEARS ENDOSCP L36CM DIA5MM ULTRASONIC CRV TIP W/ ADV

## (undated) DEVICE — TROCAR ENDOSCP L100MM DIA5MM BLDELSS STBL SL THRD OPT VW

## (undated) DEVICE — Z DISCONTINUED USE 2272114 DRAPE SURG UTIL 26X15 IN W/ TAPE N INVASIVE MULTLYR DISP

## (undated) DEVICE — STRAP,POSITIONING,KNEE/BODY,FOAM,4X60": Brand: MEDLINE

## (undated) DEVICE — GARMENT,MEDLINE,DVT,INT,CALF,MED, GEN2: Brand: MEDLINE

## (undated) DEVICE — REM POLYHESIVE ADULT PATIENT RETURN ELECTRODE: Brand: VALLEYLAB

## (undated) DEVICE — DERMABOND SKIN ADH 0.7ML -- DERMABOND ADVANCED 12/BX

## (undated) DEVICE — TROCAR ENDOSCP L100MM DIA12MM BLDELSS OBT RADLUC STBL SL

## (undated) DEVICE — SUTURE MCRYL SZ 4-0 L18IN ABSRB UD L19MM PS-2 3/8 CIR PRIM Y496G

## (undated) DEVICE — SUTURE PDS II SZ 0 L27IN ABSRB VLT L26MM CT-2 1/2 CIR Z334H

## (undated) DEVICE — SURGICAL PROCEDURE KIT GEN LAPAROSCOPY LF